# Patient Record
Sex: MALE | Race: ASIAN | NOT HISPANIC OR LATINO | Employment: FULL TIME | URBAN - METROPOLITAN AREA
[De-identification: names, ages, dates, MRNs, and addresses within clinical notes are randomized per-mention and may not be internally consistent; named-entity substitution may affect disease eponyms.]

---

## 2023-10-05 ENCOUNTER — TELEPHONE (OUTPATIENT)
Dept: PRIMARY CARE | Facility: HOSPITAL | Age: 28
End: 2023-10-05
Payer: COMMERCIAL

## 2023-10-05 DIAGNOSIS — Z79.4 TYPE 2 DIABETES MELLITUS WITHOUT COMPLICATION, WITH LONG-TERM CURRENT USE OF INSULIN (MULTI): Primary | ICD-10-CM

## 2023-10-05 DIAGNOSIS — E11.9 TYPE 2 DIABETES MELLITUS WITHOUT COMPLICATION, WITH LONG-TERM CURRENT USE OF INSULIN (MULTI): Primary | ICD-10-CM

## 2023-10-05 RX ORDER — DULAGLUTIDE 3 MG/.5ML
3 INJECTION, SOLUTION SUBCUTANEOUS
COMMUNITY
End: 2023-10-05 | Stop reason: SDUPTHER

## 2023-10-05 NOTE — TELEPHONE ENCOUNTER
Rx Refill Request Telephone Encounter    Name:  Tawanda Lomeli  :  [id number]  Medication Name:  Trulicity  3mg/0.5 ml  Subcutaneous Solution Pen-Injection  Every week  90 day supply  1 injection per week  Specific Pharmacy location:  [facility] #[id number]  Date of last appointment:  10/14/2022  Date of next appointment:  2023  Best number to reach patient:  [phone]

## 2023-10-09 RX ORDER — DULAGLUTIDE 3 MG/.5ML
3 INJECTION, SOLUTION SUBCUTANEOUS
Qty: 2 ML | Refills: 3 | Status: SHIPPED | OUTPATIENT
Start: 2023-10-09 | End: 2023-10-18

## 2023-11-08 ENCOUNTER — TELEPHONE (OUTPATIENT)
Dept: PRIMARY CARE | Facility: HOSPITAL | Age: 28
End: 2023-11-08
Payer: COMMERCIAL

## 2023-11-08 NOTE — TELEPHONE ENCOUNTER
Patient called requesting prior Auth for Trulicity per insurance company. Patient states Dr. Payne has prescribed Trulicity in the past and now insurance company will not cover the cost without documentation from Dr. Payne

## 2023-11-16 DIAGNOSIS — Z79.4 TYPE 2 DIABETES MELLITUS WITHOUT COMPLICATION, WITH LONG-TERM CURRENT USE OF INSULIN (MULTI): ICD-10-CM

## 2023-11-16 DIAGNOSIS — E11.9 TYPE 2 DIABETES MELLITUS WITHOUT COMPLICATION, WITH LONG-TERM CURRENT USE OF INSULIN (MULTI): ICD-10-CM

## 2023-11-16 RX ORDER — INSULIN HUMAN 500 [IU]/ML
180 INJECTION, SOLUTION SUBCUTANEOUS
COMMUNITY
End: 2023-11-16 | Stop reason: SDUPTHER

## 2023-11-17 ENCOUNTER — DOCUMENTATION (OUTPATIENT)
Dept: ENDOCRINOLOGY | Facility: HOSPITAL | Age: 28
End: 2023-11-17
Payer: COMMERCIAL

## 2023-11-17 RX ORDER — INSULIN HUMAN 500 [IU]/ML
INJECTION, SOLUTION SUBCUTANEOUS
Qty: 60 ML | Refills: 3 | Status: SHIPPED | OUTPATIENT
Start: 2023-11-17 | End: 2023-12-20 | Stop reason: SDUPTHER

## 2023-11-29 PROBLEM — E78.5 DYSLIPIDEMIA: Status: ACTIVE | Noted: 2023-11-29

## 2023-11-29 PROBLEM — L83 ACANTHOSIS NIGRICANS: Status: ACTIVE | Noted: 2023-11-29

## 2023-11-29 PROBLEM — E11.9 DIABETES MELLITUS TYPE 2 WITHOUT RETINOPATHY (MULTI): Status: ACTIVE | Noted: 2023-11-29

## 2023-11-29 PROBLEM — E66.9 OBESITY: Status: ACTIVE | Noted: 2023-11-29

## 2023-11-29 PROBLEM — L40.0 PSORIASIS VULGARIS: Status: ACTIVE | Noted: 2023-05-19

## 2023-11-29 PROBLEM — H26.9: Status: ACTIVE | Noted: 2023-11-29

## 2023-11-29 PROBLEM — G47.33 OBSTRUCTIVE SLEEP APNEA: Status: ACTIVE | Noted: 2023-11-29

## 2023-11-29 PROBLEM — I10 HTN (HYPERTENSION): Status: ACTIVE | Noted: 2023-11-29

## 2023-11-29 PROBLEM — K76.0 NAFLD (NONALCOHOLIC FATTY LIVER DISEASE): Status: ACTIVE | Noted: 2023-11-29

## 2023-11-29 RX ORDER — SYRINGE-NEEDLE,INSULIN,0.5 ML 30 G X1/2"
SYRINGE, EMPTY DISPOSABLE MISCELLANEOUS
COMMUNITY
Start: 2015-12-15

## 2023-11-29 RX ORDER — LISINOPRIL 20 MG/1
1 TABLET ORAL DAILY
COMMUNITY
Start: 2014-11-08 | End: 2023-12-13 | Stop reason: SDUPTHER

## 2023-11-29 RX ORDER — METFORMIN HYDROCHLORIDE 1000 MG/1
1000 TABLET ORAL
COMMUNITY
End: 2024-05-17 | Stop reason: SDUPTHER

## 2023-11-29 RX ORDER — HYDROCORTISONE 25 MG/G
OINTMENT TOPICAL
COMMUNITY
Start: 2023-05-19 | End: 2024-02-08 | Stop reason: WASHOUT

## 2023-11-29 RX ORDER — TRIAMCINOLONE ACETONIDE 1 MG/G
CREAM TOPICAL
COMMUNITY
Start: 2023-05-19 | End: 2024-01-24 | Stop reason: HOSPADM

## 2023-11-29 RX ORDER — METFORMIN HYDROCHLORIDE 500 MG/1
TABLET ORAL 2 TIMES DAILY
COMMUNITY
End: 2024-01-24 | Stop reason: HOSPADM

## 2023-12-13 ENCOUNTER — OFFICE VISIT (OUTPATIENT)
Dept: ENDOCRINOLOGY | Facility: HOSPITAL | Age: 28
End: 2023-12-13
Payer: COMMERCIAL

## 2023-12-13 VITALS
OXYGEN SATURATION: 96 % | SYSTOLIC BLOOD PRESSURE: 144 MMHG | HEART RATE: 116 BPM | DIASTOLIC BLOOD PRESSURE: 82 MMHG | HEIGHT: 66 IN | BODY MASS INDEX: 50.62 KG/M2 | WEIGHT: 315 LBS | TEMPERATURE: 97.6 F

## 2023-12-13 DIAGNOSIS — E78.5 DYSLIPIDEMIA: ICD-10-CM

## 2023-12-13 DIAGNOSIS — E66.01 CLASS 3 SEVERE OBESITY DUE TO EXCESS CALORIES WITH SERIOUS COMORBIDITY AND BODY MASS INDEX (BMI) OF 50.0 TO 59.9 IN ADULT (MULTI): ICD-10-CM

## 2023-12-13 DIAGNOSIS — E11.9 DIABETES MELLITUS TYPE 2 WITHOUT RETINOPATHY (MULTI): Primary | ICD-10-CM

## 2023-12-13 LAB
ALBUMIN SERPL BCP-MCNC: 4.2 G/DL (ref 3.4–5)
ANION GAP SERPL CALC-SCNC: 11 MMOL/L (ref 10–20)
BUN SERPL-MCNC: 12 MG/DL (ref 6–23)
CALCIUM SERPL-MCNC: 8.8 MG/DL (ref 8.6–10.6)
CHLORIDE SERPL-SCNC: 102 MMOL/L (ref 98–107)
CHOLEST SERPL-MCNC: 141 MG/DL (ref 0–199)
CHOLESTEROL/HDL RATIO: 5.4
CO2 SERPL-SCNC: 31 MMOL/L (ref 21–32)
CREAT SERPL-MCNC: 0.89 MG/DL (ref 0.5–1.3)
CREAT UR-MCNC: 110.7 MG/DL (ref 20–370)
EST. AVERAGE GLUCOSE BLD GHB EST-MCNC: 183 MG/DL
GFR SERPL CREATININE-BSD FRML MDRD: >90 ML/MIN/1.73M*2
GLUCOSE BLD MANUAL STRIP-MCNC: 81 MG/DL (ref 74–99)
GLUCOSE SERPL-MCNC: 70 MG/DL (ref 74–99)
HBA1C MFR BLD: 8 %
HDLC SERPL-MCNC: 25.9 MG/DL
LDLC SERPL CALC-MCNC: 94 MG/DL
MICROALBUMIN UR-MCNC: 569.9 MG/L
MICROALBUMIN/CREAT UR: 514.8 UG/MG CREAT
NON HDL CHOLESTEROL: 115 MG/DL (ref 0–149)
PHOSPHATE SERPL-MCNC: 4.2 MG/DL (ref 2.5–4.9)
POTASSIUM SERPL-SCNC: 4 MMOL/L (ref 3.5–5.3)
SODIUM SERPL-SCNC: 140 MMOL/L (ref 136–145)
TRIGL SERPL-MCNC: 104 MG/DL (ref 0–149)
TSH SERPL-ACNC: 3.86 MIU/L (ref 0.44–3.98)
VLDL: 21 MG/DL (ref 0–40)

## 2023-12-13 PROCEDURE — 80069 RENAL FUNCTION PANEL: CPT | Performed by: STUDENT IN AN ORGANIZED HEALTH CARE EDUCATION/TRAINING PROGRAM

## 2023-12-13 PROCEDURE — 3008F BODY MASS INDEX DOCD: CPT | Performed by: STUDENT IN AN ORGANIZED HEALTH CARE EDUCATION/TRAINING PROGRAM

## 2023-12-13 PROCEDURE — 36416 COLLJ CAPILLARY BLOOD SPEC: CPT | Performed by: STUDENT IN AN ORGANIZED HEALTH CARE EDUCATION/TRAINING PROGRAM

## 2023-12-13 PROCEDURE — 3062F POS MACROALBUMINURIA REV: CPT | Performed by: STUDENT IN AN ORGANIZED HEALTH CARE EDUCATION/TRAINING PROGRAM

## 2023-12-13 PROCEDURE — 99215 OFFICE O/P EST HI 40 MIN: CPT | Performed by: STUDENT IN AN ORGANIZED HEALTH CARE EDUCATION/TRAINING PROGRAM

## 2023-12-13 PROCEDURE — 1036F TOBACCO NON-USER: CPT | Performed by: STUDENT IN AN ORGANIZED HEALTH CARE EDUCATION/TRAINING PROGRAM

## 2023-12-13 PROCEDURE — 36415 COLL VENOUS BLD VENIPUNCTURE: CPT | Performed by: STUDENT IN AN ORGANIZED HEALTH CARE EDUCATION/TRAINING PROGRAM

## 2023-12-13 PROCEDURE — 3077F SYST BP >= 140 MM HG: CPT | Performed by: STUDENT IN AN ORGANIZED HEALTH CARE EDUCATION/TRAINING PROGRAM

## 2023-12-13 PROCEDURE — 4010F ACE/ARB THERAPY RXD/TAKEN: CPT | Performed by: STUDENT IN AN ORGANIZED HEALTH CARE EDUCATION/TRAINING PROGRAM

## 2023-12-13 PROCEDURE — 83036 HEMOGLOBIN GLYCOSYLATED A1C: CPT | Performed by: STUDENT IN AN ORGANIZED HEALTH CARE EDUCATION/TRAINING PROGRAM

## 2023-12-13 PROCEDURE — 80061 LIPID PANEL: CPT | Performed by: STUDENT IN AN ORGANIZED HEALTH CARE EDUCATION/TRAINING PROGRAM

## 2023-12-13 PROCEDURE — 82947 ASSAY GLUCOSE BLOOD QUANT: CPT | Performed by: STUDENT IN AN ORGANIZED HEALTH CARE EDUCATION/TRAINING PROGRAM

## 2023-12-13 PROCEDURE — 82043 UR ALBUMIN QUANTITATIVE: CPT | Performed by: STUDENT IN AN ORGANIZED HEALTH CARE EDUCATION/TRAINING PROGRAM

## 2023-12-13 PROCEDURE — 3079F DIAST BP 80-89 MM HG: CPT | Performed by: STUDENT IN AN ORGANIZED HEALTH CARE EDUCATION/TRAINING PROGRAM

## 2023-12-13 PROCEDURE — 84443 ASSAY THYROID STIM HORMONE: CPT | Performed by: STUDENT IN AN ORGANIZED HEALTH CARE EDUCATION/TRAINING PROGRAM

## 2023-12-13 PROCEDURE — 3052F HG A1C>EQUAL 8.0%<EQUAL 9.0%: CPT | Performed by: STUDENT IN AN ORGANIZED HEALTH CARE EDUCATION/TRAINING PROGRAM

## 2023-12-13 PROCEDURE — 3048F LDL-C <100 MG/DL: CPT | Performed by: STUDENT IN AN ORGANIZED HEALTH CARE EDUCATION/TRAINING PROGRAM

## 2023-12-13 RX ORDER — LISINOPRIL 20 MG/1
20 TABLET ORAL DAILY
Qty: 90 TABLET | Refills: 3 | Status: SHIPPED | OUTPATIENT
Start: 2023-12-13 | End: 2024-02-01 | Stop reason: SDUPTHER

## 2023-12-13 ASSESSMENT — ENCOUNTER SYMPTOMS
DEPRESSION: 0
OCCASIONAL FEELINGS OF UNSTEADINESS: 0
LOSS OF SENSATION IN FEET: 0

## 2023-12-13 ASSESSMENT — LIFESTYLE VARIABLES
HOW MANY STANDARD DRINKS CONTAINING ALCOHOL DO YOU HAVE ON A TYPICAL DAY: PATIENT DOES NOT DRINK
HOW OFTEN DO YOU HAVE A DRINK CONTAINING ALCOHOL: NEVER
HOW OFTEN DO YOU HAVE SIX OR MORE DRINKS ON ONE OCCASION: NEVER
AUDIT-C TOTAL SCORE: 0
SKIP TO QUESTIONS 9-10: 1

## 2023-12-13 ASSESSMENT — PATIENT HEALTH QUESTIONNAIRE - PHQ9
SUM OF ALL RESPONSES TO PHQ9 QUESTIONS 1 & 2: 0
1. LITTLE INTEREST OR PLEASURE IN DOING THINGS: NOT AT ALL
2. FEELING DOWN, DEPRESSED OR HOPELESS: NOT AT ALL

## 2023-12-13 ASSESSMENT — PAIN SCALES - GENERAL: PAINLEVEL: 0-NO PAIN

## 2023-12-13 NOTE — LETTER
December 13, 2023     Patient: Tawanda Lomeli   YOB: 1995   Date of Visit: 12/13/2023       To Whom It May Concern:    Tawanda Lomeli was seen in my clinic on 12/13/2023 at 8:00 am. Please excuse Tawanda for his absence from work on this day to make the appointment.    If you have any questions or concerns, please don't hesitate to call.         Sincerely,         Cecelia Payne MD        CC: No Recipients

## 2023-12-13 NOTE — PROGRESS NOTES
Patient coming in for follow up for T2DM    Subjective   Tawanda Lomeli is a 28 y.o. male who presents for follow up for Type 2 diabetes mellitus.   Lab Results   Component Value Date    HGBA1C 6.3 (A) 10/14/2022    Mr. Lomeli is a 28-year-old man with history of IDDM on U500, morbid obesity, NAFLD, psoriasis and hyperlipidemia coming in for follow-up.  date of diagnosis: age 11.Date of last HbA1c: October 2022 and results: 6.3%.   Last seen Jan 2023.  Lost his insurance for few months couldn't get his trulicity. BG were elevated  In the morning feels like he needs to use the bathroom for a longer time.  Feels like there's a lot of gas   Initially lost weight 80 lbs and now gained it back. 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  U500 36 units Before Breakfast, lunch and dinner.   Tulicity 3 mg restarted 3 weeks ago on Monday  Metformin 1000 mg BID   Lisinopril, Fenofibrate , Atorvastatin   BG in am: [phone]  BG bed time: [phone]  Had couple BG in low 100s and felt shakes.  In the past month doesn't have appetite.  Breakfast: Coffee  Lunch: Rice and chicken  Dinner: cup of rice and fish whatever he can find.   Scheduled to see dermatologist in January for psoriasis  Diabetes Surveillance: Eye exam: Jan 2023 Foot exam/podiatrist: Jan 13, 2023 Scheduled for 2024  Opthalmic: Diabetes mellitus with both eyes affected by mild nonproliferative retinopathy without macular edema  Cardiovascular: no coronary artery bypass graft and no coronary artery disease. Atorvastatin and fenofibrate.   Renal: nephropathy UACR 2022 155.5, but no end stage renal disease .On Lisinopril.   Neurologic: no neuropathy.     Ran out of Atorvastatin and fenofibrate for 3 days  Out of lisinopril    Review of Systems  all pertinent systems reviewed and are otherwise negative   Objective   /82 (BP Location:  "Right arm, Patient Position: Sitting, BP Cuff Size: Adult)   Pulse (!) 116   Temp 36.4 °C (97.6 °F) (Temporal)   Ht 1.676 m (5' 6\")   Wt (!) 160 kg (352 lb)   SpO2 96%   BMI 56.81 kg/m²   Physical Exam  Constitutional:       General: He is not in acute distress.     Appearance: Normal appearance. He is obese.   Eyes:      Extraocular Movements: Extraocular movements intact.      Pupils: Pupils are equal, round, and reactive to light.   Cardiovascular:      Rate and Rhythm: Normal rate and regular rhythm.   Pulmonary:      Effort: Pulmonary effort is normal. No respiratory distress.      Breath sounds: Normal breath sounds.   Abdominal:      General: Bowel sounds are normal.      Palpations: Abdomen is soft.      Tenderness: There is no abdominal tenderness.   Skin:     General: Skin is dry.      Coloration: Skin is not jaundiced or pale.      Findings: Lesion and rash present. No erythema.      Comments: Acanthosis nigricans and some plaques resembling psoriasis   Neurological:      General: No focal deficit present.      Mental Status: He is alert and oriented to person, place, and time.      Deep Tendon Reflexes: Reflexes normal.   Psychiatric:         Mood and Affect: Mood normal.         Behavior: Behavior normal.         Lab Review  Glucose (mg/dL)   Date Value   10/14/2022 104 (H)   06/24/2022 80   01/07/2022 154 (H)     Hemoglobin A1C (%)   Date Value   10/14/2022 6.3 (A)   06/24/2022 6.6 (A)   01/07/2022 8.5 (A)     Bicarbonate (mmol/L)   Date Value   10/14/2022 30   06/24/2022 27   01/07/2022 29     Urea Nitrogen (mg/dL)   Date Value   10/14/2022 12   06/24/2022 9   01/07/2022 11     Creatinine (mg/dL)   Date Value   10/14/2022 0.73   06/24/2022 0.81   01/07/2022 0.77     Lab Results   Component Value Date    CHOL 115 06/24/2022    CHOL 150 07/16/2021    CHOL 156 03/15/2021     Lab Results   Component Value Date    HDL 23.8 (A) 06/24/2022    HDL 20.4 (A) 07/16/2021    HDL 16.3 (A) 03/15/2021     No " "results found for: \"LDLCALC\"  Lab Results   Component Value Date    TRIG 146 06/24/2022    TRIG 328 (H) 07/16/2021    TRIG 552 (H) 03/15/2021     No components found for: \"CHOLHDL\"   Lab Results   Component Value Date    TSH 2.94 06/24/2022       Assessment/Plan    Mr. Lomeli is a 28-year-old man with history of IDDM on U500, morbid obesity, NAFLD, psoriasis and hyperlipidemia coming in for follow-up.  date of diagnosis: age 11.Date of last HbA1c: October 2022 and results: 6.3%.   Last seen Jan 2023.. Lost his insurance for few months couldn't get his trulicity. BG were elevated  In the morning feels like he needs to use the bathroom for a longer time. Feels like there's a lot of gas   Initially lost weight 80 lbs and now gained it back. 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  U500 36 units Before Breakfast, lunch and dinner.   Tulicity 3 mg restarted 3 weeks ago on Monday  Metformin 1000 mg BID   Lisinopril, Fenofibrate , Atorvastatin   BG in am: [phone]  BG bed time: [phone]  Had couple BG in low 100s and felt shakes.  In the past month doesn't have appetite.  Diabetes Surveillance: Eye exam: Jan 2023 Foot exam/podiatrist: Jan 13, 2023 Scheduled for 2024  Opthalmic: Diabetes mellitus with both eyes affected by mild nonproliferative retinopathy without macular edema  Cardiovascular: no coronary artery bypass graft and no coronary artery disease. Atorvastatin and fenofibrate.   Renal: nephropathy UACR 2022 155.5, but no end stage renal disease .On Lisinopril.   Neurologic: no neuropathy.     Plan:  Continue U500 36 units three times daily  Continue Trulicity 3 mg weekly  Continue to monitor BG  Follow up with dietician  Watch diet and start exercising  If in 1 month no weight loss we will try to switch to ozempic or mounjaro  Continue Atorvastatin and fenofibrate  Continue " lisinopril  Blood and urine today.  We might consider adding Jardiance     RTC in [facility]  Problem List Items Addressed This Visit       Diabetes mellitus type 2 without retinopathy (CMS/[facility]) - Primary    Relevant Medications    lisinopril 20 mg tablet    Other Relevant Orders    Renal Function Panel    Albumin , Urine Random    Lipid Panel    Hemoglobin A1C    TSH with reflex to Free T4 if abnormal    Referral to Nutrition Services    Dyslipidemia    Relevant Orders    Lipid Panel    Obesity    Relevant Orders    Lipid Panel    Hemoglobin A1C    TSH with reflex to Free T4 if abnormal    Referral to Nutrition Services

## 2023-12-13 NOTE — PATIENT INSTRUCTIONS
Continue U500 36 units three times daily  Continue Trulicity 3 mg weekly  Continue to monitor BG if low BG let me know\  Follow up with dietician  Watch diet and start exercising  If in 1 month no weight loss please let me know we will try to switch to ozempic or mounjaro  Continue Atorvastatin and fenofibrate  Continue lisinopril  Blood and urine today.  We might consider adding Matilde     RTC in [facility]

## 2023-12-13 NOTE — LETTER
December 13, 2023     Patient: Tawanda Lomeli   YOB: 1995   Date of Visit: 12/13/2023       To Whom It May Concern:    Mr. Tawanda Lomeli is on Trulicity which causes some gastrointestinal side effects. He needs 2 bathroom breaks and can take up to 15 min.    If you have any questions or concerns, please don't hesitate to call.         Sincerely,        Cecelia Payne MD

## 2023-12-20 DIAGNOSIS — E11.9 TYPE 2 DIABETES MELLITUS WITHOUT COMPLICATION, WITH LONG-TERM CURRENT USE OF INSULIN (MULTI): ICD-10-CM

## 2023-12-20 DIAGNOSIS — Z79.4 TYPE 2 DIABETES MELLITUS WITHOUT COMPLICATION, WITH LONG-TERM CURRENT USE OF INSULIN (MULTI): ICD-10-CM

## 2023-12-25 RX ORDER — INSULIN HUMAN 500 [IU]/ML
INJECTION, SOLUTION SUBCUTANEOUS
Qty: 50 ML | Refills: 3 | Status: SHIPPED | OUTPATIENT
Start: 2023-12-25 | End: 2024-05-16 | Stop reason: SDUPTHER

## 2024-01-11 ENCOUNTER — OFFICE VISIT (OUTPATIENT)
Dept: PODIATRY | Facility: CLINIC | Age: 29
End: 2024-01-11
Payer: COMMERCIAL

## 2024-01-11 ENCOUNTER — TELEPHONE (OUTPATIENT)
Dept: PRIMARY CARE | Facility: HOSPITAL | Age: 29
End: 2024-01-11

## 2024-01-11 DIAGNOSIS — B35.1 ONYCHOMYCOSIS: ICD-10-CM

## 2024-01-11 DIAGNOSIS — M79.671 PAIN IN BOTH FEET: ICD-10-CM

## 2024-01-11 DIAGNOSIS — E11.8 DIABETIC FOOT (MULTI): Primary | ICD-10-CM

## 2024-01-11 DIAGNOSIS — E11.9 DIABETES MELLITUS TYPE 2 WITHOUT RETINOPATHY (MULTI): Primary | ICD-10-CM

## 2024-01-11 DIAGNOSIS — M79.672 PAIN IN BOTH FEET: ICD-10-CM

## 2024-01-11 DIAGNOSIS — M54.16 LUMBAR RADICULOPATHY: ICD-10-CM

## 2024-01-11 DIAGNOSIS — G47.33 OBSTRUCTIVE SLEEP APNEA: ICD-10-CM

## 2024-01-11 DIAGNOSIS — L85.3 XEROSIS OF SKIN: ICD-10-CM

## 2024-01-11 PROCEDURE — 1036F TOBACCO NON-USER: CPT | Performed by: PODIATRIST

## 2024-01-11 PROCEDURE — 4010F ACE/ARB THERAPY RXD/TAKEN: CPT | Performed by: PODIATRIST

## 2024-01-11 PROCEDURE — 99213 OFFICE O/P EST LOW 20 MIN: CPT | Performed by: PODIATRIST

## 2024-01-11 PROCEDURE — 3008F BODY MASS INDEX DOCD: CPT | Performed by: PODIATRIST

## 2024-01-11 RX ORDER — PRAVASTATIN SODIUM 40 MG/1
40 TABLET ORAL
COMMUNITY
Start: 2016-04-26 | End: 2024-01-24 | Stop reason: HOSPADM

## 2024-01-11 RX ORDER — FENOFIBRATE 160 MG/1
160 TABLET ORAL DAILY
COMMUNITY
End: 2024-03-04 | Stop reason: SDUPTHER

## 2024-01-11 RX ORDER — ATORVASTATIN CALCIUM 40 MG/1
40 TABLET, FILM COATED ORAL NIGHTLY
COMMUNITY
End: 2024-03-04 | Stop reason: SDUPTHER

## 2024-01-11 NOTE — PROGRESS NOTES
Chief Complaint   Patient presents with    DM Foot Care     ALEJANDRO    Patient is here today for yearly diabetic foot exam.  Patient lost his insurance and was off some medication for awhile.  Patient is now starting back on his prescription medications after getting a job.  Patient works from home and sits for longer.'s of times at a desk than before.  Glucose was as high as 13, had it down to 6 before he lost insurance.  Patient notices some sharp radiating pains and feels like his feet are falling asleep specially when he is sitting with legs elevated.  Patient does have an upcoming appointment with dermatology for his psoriasis.  Is not currently using ammonium lactate lotion.        Physical Exam  Patient alert, oriented, no acute distress     VASC: +2/4 DP, unable to palpate PT secondary to edema B/L. CFT brisk all digits. Feet warm to touch. (+)hair growth B/L. Moderate LE edema B/L. No weeping noted.     NEURO: Vibratory intact B/L.  Light touch intact B/L.   5.07 [place] monofilament intact B/L.     DERM:Nails 1 bilateral are thickened, discolored, crumbly, painful and elongated with subungual debris. Mild distal fungal infection. Cryptotic hallux nails B/L, no pain. Dry dark, thick scaly patches on skin on legs , mild xerosis plantar feet. No ulcers, no fissures, no bulla, no weeping noted.     MUSCULOSKEL: +5/5 muscle strength B/L. pes planus B/L . Decreased ankle joint ROM B/L.    Lab Results   Component Value Date    HGBA1C 8.0 (H) 12/13/2023     Assessment and plan  #1 DM  discussed general diabetic foot care.  Discussed good control of glucose will help control any burning sensations that are secondary to the elevated glucose.  Follow-up yearly     #2 xerosis and acanthosis nigricans  Patient declines ammonium lactate at this time.    Patient elects to follow-up with dermatology and follow their recommendations    #3  Radiculopathy  Refer to orthopedics  Follow-up as needed

## 2024-01-11 NOTE — TELEPHONE ENCOUNTER
Patient called in stating that he is having side affects to Trulicity that is affecting him like bloated, no energy, and stomach pain, low blood sugary sometimes. Patient would like a call back to see what are his options in getting something different, please and thank you!    Patient reports Trulicity last week had sore abdomen as if full of gas, felt dizzy afterwards.This week was a little better but still had some hard abdomen?  We suggested decreasing trulicity to 1.5 mg for 4 weeks then increase back to 3 if tolerated.

## 2024-01-16 RX ORDER — DULAGLUTIDE 1.5 MG/.5ML
1.5 INJECTION, SOLUTION SUBCUTANEOUS
Qty: 2 ML | Refills: 1 | Status: SHIPPED | OUTPATIENT
Start: 2024-01-16 | End: 2024-01-26 | Stop reason: SDUPTHER

## 2024-01-19 ENCOUNTER — APPOINTMENT (OUTPATIENT)
Dept: RADIOLOGY | Facility: HOSPITAL | Age: 29
DRG: 193 | End: 2024-01-19
Payer: COMMERCIAL

## 2024-01-19 ENCOUNTER — HOSPITAL ENCOUNTER (INPATIENT)
Facility: HOSPITAL | Age: 29
LOS: 5 days | Discharge: HOME | DRG: 193 | End: 2024-01-24
Attending: INTERNAL MEDICINE | Admitting: STUDENT IN AN ORGANIZED HEALTH CARE EDUCATION/TRAINING PROGRAM
Payer: COMMERCIAL

## 2024-01-19 ENCOUNTER — APPOINTMENT (OUTPATIENT)
Dept: CARDIOLOGY | Facility: HOSPITAL | Age: 29
DRG: 193 | End: 2024-01-19
Payer: COMMERCIAL

## 2024-01-19 DIAGNOSIS — R09.02 HYPOXIA: ICD-10-CM

## 2024-01-19 DIAGNOSIS — R06.02 SHORTNESS OF BREATH: Primary | ICD-10-CM

## 2024-01-19 DIAGNOSIS — G47.33 OBSTRUCTIVE SLEEP APNEA: ICD-10-CM

## 2024-01-19 DIAGNOSIS — R06.89 HYPERCAPNIA: ICD-10-CM

## 2024-01-19 DIAGNOSIS — I10 HYPERTENSION, UNSPECIFIED TYPE: ICD-10-CM

## 2024-01-19 DIAGNOSIS — R06.09 OTHER FORMS OF DYSPNEA: ICD-10-CM

## 2024-01-19 DIAGNOSIS — E66.01 CLASS 3 SEVERE OBESITY WITH SERIOUS COMORBIDITY AND BODY MASS INDEX (BMI) OF 50.0 TO 59.9 IN ADULT, UNSPECIFIED OBESITY TYPE (MULTI): ICD-10-CM

## 2024-01-19 LAB
ALBUMIN SERPL BCP-MCNC: 4 G/DL (ref 3.4–5)
ALP SERPL-CCNC: 61 U/L (ref 33–120)
ALT SERPL W P-5'-P-CCNC: 11 U/L (ref 10–52)
ANION GAP SERPL CALC-SCNC: 12 MMOL/L (ref 10–20)
APTT PPP: 37 SECONDS (ref 27–38)
AST SERPL W P-5'-P-CCNC: 20 U/L (ref 9–39)
B-OH-BUTYR SERPL-SCNC: 0.16 MMOL/L (ref 0.02–0.27)
BASE EXCESS BLDV CALC-SCNC: 5.7 MMOL/L (ref -2–3)
BASOPHILS # BLD AUTO: 0.07 X10*3/UL (ref 0–0.1)
BASOPHILS NFR BLD AUTO: 0.7 %
BILIRUB SERPL-MCNC: 1.1 MG/DL (ref 0–1.2)
BNP SERPL-MCNC: 251 PG/ML (ref 0–99)
BODY TEMPERATURE: 37 DEGREES CELSIUS
BUN SERPL-MCNC: 15 MG/DL (ref 6–23)
CALCIUM SERPL-MCNC: 8.4 MG/DL (ref 8.6–10.3)
CARDIAC TROPONIN I PNL SERPL HS: 23 NG/L (ref 0–20)
CARDIAC TROPONIN I PNL SERPL HS: 27 NG/L (ref 0–20)
CHLORIDE SERPL-SCNC: 96 MMOL/L (ref 98–107)
CO2 SERPL-SCNC: 30 MMOL/L (ref 21–32)
CREAT SERPL-MCNC: 1.18 MG/DL (ref 0.5–1.3)
CRITICAL CALL TIME: 2132
CRITICAL CALLED BY: ABNORMAL
CRITICAL CALLED TO: ABNORMAL
CRITICAL READ BACK: ABNORMAL
D DIMER PPP FEU-MCNC: 540 NG/ML FEU
EGFRCR SERPLBLD CKD-EPI 2021: 86 ML/MIN/1.73M*2
EOSINOPHIL # BLD AUTO: 0.13 X10*3/UL (ref 0–0.7)
EOSINOPHIL NFR BLD AUTO: 1.3 %
ERYTHROCYTE [DISTWIDTH] IN BLOOD BY AUTOMATED COUNT: 17.4 % (ref 11.5–14.5)
FLUAV RNA RESP QL NAA+PROBE: NOT DETECTED
FLUBV RNA RESP QL NAA+PROBE: NOT DETECTED
GLUCOSE SERPL-MCNC: 150 MG/DL (ref 74–99)
HCO3 BLDV-SCNC: 35.4 MMOL/L (ref 22–26)
HCT VFR BLD AUTO: 51.5 % (ref 41–52)
HGB BLD-MCNC: 15 G/DL (ref 13.5–17.5)
IMM GRANULOCYTES # BLD AUTO: 0.05 X10*3/UL (ref 0–0.7)
IMM GRANULOCYTES NFR BLD AUTO: 0.5 % (ref 0–0.9)
INHALED O2 CONCENTRATION: 21 %
INR PPP: 1.7 (ref 0.9–1.1)
LYMPHOCYTES # BLD AUTO: 0.88 X10*3/UL (ref 1.2–4.8)
LYMPHOCYTES NFR BLD AUTO: 8.5 %
MAGNESIUM SERPL-MCNC: 1.55 MG/DL (ref 1.6–2.4)
MCH RBC QN AUTO: 22.5 PG (ref 26–34)
MCHC RBC AUTO-ENTMCNC: 29.1 G/DL (ref 32–36)
MCV RBC AUTO: 77 FL (ref 80–100)
MONOCYTES # BLD AUTO: 0.8 X10*3/UL (ref 0.1–1)
MONOCYTES NFR BLD AUTO: 7.7 %
NEUTROPHILS # BLD AUTO: 8.47 X10*3/UL (ref 1.2–7.7)
NEUTROPHILS NFR BLD AUTO: 81.3 %
NRBC BLD-RTO: 0.2 /100 WBCS (ref 0–0)
OXYHGB MFR BLDV: 35.1 % (ref 45–75)
PCO2 BLDV: 77 MM HG (ref 41–51)
PH BLDV: 7.27 PH (ref 7.33–7.43)
PLATELET # BLD AUTO: 257 X10*3/UL (ref 150–450)
PO2 BLDV: 29 MM HG (ref 35–45)
POTASSIUM SERPL-SCNC: 4.3 MMOL/L (ref 3.5–5.3)
PROT SERPL-MCNC: 7.3 G/DL (ref 6.4–8.2)
PROTHROMBIN TIME: 19.5 SECONDS (ref 9.8–12.8)
RBC # BLD AUTO: 6.66 X10*6/UL (ref 4.5–5.9)
SAO2 % BLDV: 36 % (ref 45–75)
SARS-COV-2 RNA RESP QL NAA+PROBE: NOT DETECTED
SODIUM SERPL-SCNC: 134 MMOL/L (ref 136–145)
WBC # BLD AUTO: 10.4 X10*3/UL (ref 4.4–11.3)

## 2024-01-19 PROCEDURE — 87636 SARSCOV2 & INF A&B AMP PRB: CPT | Performed by: PHYSICIAN ASSISTANT

## 2024-01-19 PROCEDURE — 99285 EMERGENCY DEPT VISIT HI MDM: CPT | Performed by: INTERNAL MEDICINE

## 2024-01-19 PROCEDURE — 83880 ASSAY OF NATRIURETIC PEPTIDE: CPT | Performed by: PHYSICIAN ASSISTANT

## 2024-01-19 PROCEDURE — 85610 PROTHROMBIN TIME: CPT | Performed by: INTERNAL MEDICINE

## 2024-01-19 PROCEDURE — 80061 LIPID PANEL: CPT

## 2024-01-19 PROCEDURE — 94660 CPAP INITIATION&MGMT: CPT

## 2024-01-19 PROCEDURE — 36415 COLL VENOUS BLD VENIPUNCTURE: CPT | Performed by: PHYSICIAN ASSISTANT

## 2024-01-19 PROCEDURE — 2060000001 HC INTERMEDIATE ICU ROOM DAILY

## 2024-01-19 PROCEDURE — 99291 CRITICAL CARE FIRST HOUR: CPT | Mod: 27

## 2024-01-19 PROCEDURE — 71045 X-RAY EXAM CHEST 1 VIEW: CPT | Mod: FOREIGN READ | Performed by: RADIOLOGY

## 2024-01-19 PROCEDURE — 85025 COMPLETE CBC W/AUTO DIFF WBC: CPT | Performed by: PHYSICIAN ASSISTANT

## 2024-01-19 PROCEDURE — 80053 COMPREHEN METABOLIC PANEL: CPT | Performed by: PHYSICIAN ASSISTANT

## 2024-01-19 PROCEDURE — 5A09357 ASSISTANCE WITH RESPIRATORY VENTILATION, LESS THAN 24 CONSECUTIVE HOURS, CONTINUOUS POSITIVE AIRWAY PRESSURE: ICD-10-PCS | Performed by: STUDENT IN AN ORGANIZED HEALTH CARE EDUCATION/TRAINING PROGRAM

## 2024-01-19 PROCEDURE — 85379 FIBRIN DEGRADATION QUANT: CPT | Performed by: INTERNAL MEDICINE

## 2024-01-19 PROCEDURE — 82805 BLOOD GASES W/O2 SATURATION: CPT | Performed by: PHYSICIAN ASSISTANT

## 2024-01-19 PROCEDURE — 71045 X-RAY EXAM CHEST 1 VIEW: CPT

## 2024-01-19 PROCEDURE — 2500000001 HC RX 250 WO HCPCS SELF ADMINISTERED DRUGS (ALT 637 FOR MEDICARE OP): Performed by: PHYSICIAN ASSISTANT

## 2024-01-19 PROCEDURE — 83735 ASSAY OF MAGNESIUM: CPT | Performed by: PHYSICIAN ASSISTANT

## 2024-01-19 PROCEDURE — 82010 KETONE BODYS QUAN: CPT | Performed by: PHYSICIAN ASSISTANT

## 2024-01-19 PROCEDURE — 84484 ASSAY OF TROPONIN QUANT: CPT | Performed by: PHYSICIAN ASSISTANT

## 2024-01-19 PROCEDURE — 96374 THER/PROPH/DIAG INJ IV PUSH: CPT

## 2024-01-19 PROCEDURE — 2500000004 HC RX 250 GENERAL PHARMACY W/ HCPCS (ALT 636 FOR OP/ED)

## 2024-01-19 PROCEDURE — 2500000004 HC RX 250 GENERAL PHARMACY W/ HCPCS (ALT 636 FOR OP/ED): Performed by: PHYSICIAN ASSISTANT

## 2024-01-19 PROCEDURE — 85730 THROMBOPLASTIN TIME PARTIAL: CPT | Performed by: INTERNAL MEDICINE

## 2024-01-19 PROCEDURE — 93005 ELECTROCARDIOGRAM TRACING: CPT

## 2024-01-19 RX ORDER — INSULIN LISPRO 100 [IU]/ML
0-10 INJECTION, SOLUTION INTRAVENOUS; SUBCUTANEOUS EVERY 4 HOURS
Status: DISCONTINUED | OUTPATIENT
Start: 2024-01-20 | End: 2024-01-21

## 2024-01-19 RX ORDER — CEFTRIAXONE 1 G/50ML
1 INJECTION, SOLUTION INTRAVENOUS EVERY 24 HOURS
Status: DISCONTINUED | OUTPATIENT
Start: 2024-01-19 | End: 2024-01-24 | Stop reason: HOSPADM

## 2024-01-19 RX ORDER — ACETYLCYSTEINE 200 MG/ML
3 SOLUTION ORAL; RESPIRATORY (INHALATION)
Status: DISCONTINUED | OUTPATIENT
Start: 2024-01-19 | End: 2024-01-20

## 2024-01-19 RX ORDER — DEXTROSE 50 % IN WATER (D50W) INTRAVENOUS SYRINGE
25
Status: DISCONTINUED | OUTPATIENT
Start: 2024-01-19 | End: 2024-01-24 | Stop reason: HOSPADM

## 2024-01-19 RX ORDER — IPRATROPIUM BROMIDE AND ALBUTEROL SULFATE 2.5; .5 MG/3ML; MG/3ML
3 SOLUTION RESPIRATORY (INHALATION)
Status: DISCONTINUED | OUTPATIENT
Start: 2024-01-20 | End: 2024-01-19

## 2024-01-19 RX ORDER — IPRATROPIUM BROMIDE AND ALBUTEROL SULFATE 2.5; .5 MG/3ML; MG/3ML
3 SOLUTION RESPIRATORY (INHALATION) EVERY 2 HOUR PRN
Status: DISCONTINUED | OUTPATIENT
Start: 2024-01-19 | End: 2024-01-24 | Stop reason: HOSPADM

## 2024-01-19 RX ORDER — DEXTROSE MONOHYDRATE 100 MG/ML
0.3 INJECTION, SOLUTION INTRAVENOUS ONCE AS NEEDED
Status: DISCONTINUED | OUTPATIENT
Start: 2024-01-19 | End: 2024-01-24 | Stop reason: HOSPADM

## 2024-01-19 RX ORDER — FENOFIBRATE 160 MG/1
160 TABLET ORAL DAILY
Status: DISCONTINUED | OUTPATIENT
Start: 2024-01-20 | End: 2024-01-24 | Stop reason: HOSPADM

## 2024-01-19 RX ORDER — FUROSEMIDE 10 MG/ML
40 INJECTION INTRAMUSCULAR; INTRAVENOUS ONCE
Status: COMPLETED | OUTPATIENT
Start: 2024-01-19 | End: 2024-01-19

## 2024-01-19 RX ORDER — PRAVASTATIN SODIUM 20 MG/1
40 TABLET ORAL
Status: DISCONTINUED | OUTPATIENT
Start: 2024-01-20 | End: 2024-01-20

## 2024-01-19 RX ORDER — BENZONATATE 100 MG/1
100 CAPSULE ORAL 3 TIMES DAILY PRN
Status: DISCONTINUED | OUTPATIENT
Start: 2024-01-19 | End: 2024-01-24 | Stop reason: HOSPADM

## 2024-01-19 RX ORDER — ATORVASTATIN CALCIUM 40 MG/1
40 TABLET, FILM COATED ORAL NIGHTLY
Status: DISCONTINUED | OUTPATIENT
Start: 2024-01-20 | End: 2024-01-24 | Stop reason: HOSPADM

## 2024-01-19 RX ADMIN — AZITHROMYCIN MONOHYDRATE 500 MG: 500 INJECTION, POWDER, LYOPHILIZED, FOR SOLUTION INTRAVENOUS at 23:45

## 2024-01-19 RX ADMIN — FUROSEMIDE 40 MG: 10 INJECTION, SOLUTION INTRAMUSCULAR; INTRAVENOUS at 22:16

## 2024-01-19 RX ADMIN — NITROGLYCERIN 0.5 INCH: 20 OINTMENT TOPICAL at 22:16

## 2024-01-19 ASSESSMENT — LIFESTYLE VARIABLES
HAVE YOU EVER FELT YOU SHOULD CUT DOWN ON YOUR DRINKING: NO
EVER FELT BAD OR GUILTY ABOUT YOUR DRINKING: NO
EVER HAD A DRINK FIRST THING IN THE MORNING TO STEADY YOUR NERVES TO GET RID OF A HANGOVER: NO
REASON UNABLE TO ASSESS: NO
HAVE PEOPLE ANNOYED YOU BY CRITICIZING YOUR DRINKING: NO

## 2024-01-19 ASSESSMENT — COLUMBIA-SUICIDE SEVERITY RATING SCALE - C-SSRS
6. HAVE YOU EVER DONE ANYTHING, STARTED TO DO ANYTHING, OR PREPARED TO DO ANYTHING TO END YOUR LIFE?: NO
2. HAVE YOU ACTUALLY HAD ANY THOUGHTS OF KILLING YOURSELF?: NO
1. IN THE PAST MONTH, HAVE YOU WISHED YOU WERE DEAD OR WISHED YOU COULD GO TO SLEEP AND NOT WAKE UP?: NO

## 2024-01-19 ASSESSMENT — PAIN - FUNCTIONAL ASSESSMENT: PAIN_FUNCTIONAL_ASSESSMENT: 0-10

## 2024-01-19 ASSESSMENT — PAIN SCALES - GENERAL: PAINLEVEL_OUTOF10: 0 - NO PAIN

## 2024-01-20 ENCOUNTER — APPOINTMENT (OUTPATIENT)
Dept: RADIOLOGY | Facility: HOSPITAL | Age: 29
DRG: 193 | End: 2024-01-20
Payer: COMMERCIAL

## 2024-01-20 LAB
ANION GAP BLDA CALCULATED.4IONS-SCNC: 3 MMO/L (ref 10–25)
ANION GAP BLDA CALCULATED.4IONS-SCNC: 5 MMO/L (ref 10–25)
ANION GAP BLDA CALCULATED.4IONS-SCNC: 6 MMO/L (ref 10–25)
ANION GAP SERPL CALC-SCNC: 8 MMOL/L (ref 10–20)
APPARATUS: ABNORMAL
ARTERIAL PATENCY WRIST A: POSITIVE
BASE EXCESS BLDA CALC-SCNC: 4.8 MMOL/L (ref -2–3)
BASE EXCESS BLDA CALC-SCNC: 5.7 MMOL/L (ref -2–3)
BASE EXCESS BLDA CALC-SCNC: 6 MMOL/L (ref -2–3)
BASE EXCESS BLDA CALC-SCNC: 6.5 MMOL/L (ref -2–3)
BASE EXCESS BLDA CALC-SCNC: 7.1 MMOL/L (ref -2–3)
BASOPHILS # BLD AUTO: 0.04 X10*3/UL (ref 0–0.1)
BASOPHILS NFR BLD AUTO: 0.3 %
BODY TEMPERATURE: 37 DEGREES CELSIUS
BUN SERPL-MCNC: 15 MG/DL (ref 6–23)
CA-I BLDA-SCNC: 1.12 MMOL/L (ref 1.1–1.33)
CA-I BLDA-SCNC: 1.13 MMOL/L (ref 1.1–1.33)
CA-I BLDA-SCNC: 1.15 MMOL/L (ref 1.1–1.33)
CALCIUM SERPL-MCNC: 8.7 MG/DL (ref 8.6–10.3)
CHLORIDE BLDA-SCNC: 95 MMOL/L (ref 98–107)
CHLORIDE BLDA-SCNC: 95 MMOL/L (ref 98–107)
CHLORIDE BLDA-SCNC: 97 MMOL/L (ref 98–107)
CHLORIDE SERPL-SCNC: 95 MMOL/L (ref 98–107)
CHOLEST SERPL-MCNC: 62 MG/DL (ref 0–199)
CHOLESTEROL/HDL RATIO: 3.7
CO2 SERPL-SCNC: 36 MMOL/L (ref 21–32)
CREAT SERPL-MCNC: 1.16 MG/DL (ref 0.5–1.3)
CRITICAL CALL TIME: 1105
CRITICAL CALL TIME: 1519
CRITICAL CALL TIME: 1700
CRITICAL CALL TIME: 2032
CRITICAL CALL TIME: 2213
CRITICAL CALLED BY: ABNORMAL
CRITICAL CALLED TO: ABNORMAL
CRITICAL READ BACK: ABNORMAL
EGFRCR SERPLBLD CKD-EPI 2021: 88 ML/MIN/1.73M*2
EOSINOPHIL # BLD AUTO: 0.11 X10*3/UL (ref 0–0.7)
EOSINOPHIL NFR BLD AUTO: 0.9 %
EPAP CMH2O: 6 CM H2O
EPAP CMH2O: 6 CM H2O
ERYTHROCYTE [DISTWIDTH] IN BLOOD BY AUTOMATED COUNT: 17.2 % (ref 11.5–14.5)
FLOW: 10 LPM
FREQUENCY (BPM): 29 BPM
FREQUENCY (BPM): 37 BPM
GLUCOSE BLD MANUAL STRIP-MCNC: 121 MG/DL (ref 74–99)
GLUCOSE BLD MANUAL STRIP-MCNC: 122 MG/DL (ref 74–99)
GLUCOSE BLD MANUAL STRIP-MCNC: 129 MG/DL (ref 74–99)
GLUCOSE BLD MANUAL STRIP-MCNC: 131 MG/DL (ref 74–99)
GLUCOSE BLD MANUAL STRIP-MCNC: 131 MG/DL (ref 74–99)
GLUCOSE BLD MANUAL STRIP-MCNC: 142 MG/DL (ref 74–99)
GLUCOSE BLDA-MCNC: 137 MG/DL (ref 74–99)
GLUCOSE BLDA-MCNC: 141 MG/DL (ref 74–99)
GLUCOSE BLDA-MCNC: 153 MG/DL (ref 74–99)
GLUCOSE SERPL-MCNC: 135 MG/DL (ref 74–99)
HCO3 BLDA-SCNC: 36 MMOL/L (ref 22–26)
HCO3 BLDA-SCNC: 36.4 MMOL/L (ref 22–26)
HCO3 BLDA-SCNC: 37.3 MMOL/L (ref 22–26)
HCO3 BLDA-SCNC: 37.7 MMOL/L (ref 22–26)
HCO3 BLDA-SCNC: 38 MMOL/L (ref 22–26)
HCT VFR BLD AUTO: 50.6 % (ref 41–52)
HCT VFR BLD EST: 44 % (ref 41–52)
HDLC SERPL-MCNC: 16.6 MG/DL
HGB BLD-MCNC: 15.1 G/DL (ref 13.5–17.5)
HGB BLDA-MCNC: 14.6 G/DL (ref 13.5–17.5)
HGB BLDA-MCNC: 14.7 G/DL (ref 13.5–17.5)
HGB BLDA-MCNC: 14.8 G/DL (ref 13.5–17.5)
IMM GRANULOCYTES # BLD AUTO: 0.05 X10*3/UL (ref 0–0.7)
IMM GRANULOCYTES NFR BLD AUTO: 0.4 % (ref 0–0.9)
INHALED O2 CONCENTRATION: 55 %
INHALED O2 CONCENTRATION: 55 %
INHALED O2 CONCENTRATION: 56 %
INHALED O2 CONCENTRATION: 70 %
INHALED O2 CONCENTRATION: 70 %
IPAP CMH2O: 14 CM H2O
IPAP CMH2O: 18 CM H2O
LACTATE BLDA-SCNC: 0.8 MMOL/L (ref 0.4–2)
LACTATE BLDA-SCNC: 0.8 MMOL/L (ref 0.4–2)
LACTATE BLDA-SCNC: 0.9 MMOL/L (ref 0.4–2)
LACTATE SERPL-SCNC: 0.7 MMOL/L (ref 0.4–2)
LDLC SERPL CALC-MCNC: 32 MG/DL
LYMPHOCYTES # BLD AUTO: 1.05 X10*3/UL (ref 1.2–4.8)
LYMPHOCYTES NFR BLD AUTO: 8.6 %
MAGNESIUM SERPL-MCNC: 1.67 MG/DL (ref 1.6–2.4)
MCH RBC QN AUTO: 23 PG (ref 26–34)
MCHC RBC AUTO-ENTMCNC: 29.8 G/DL (ref 32–36)
MCV RBC AUTO: 77 FL (ref 80–100)
MONOCYTES # BLD AUTO: 1.11 X10*3/UL (ref 0.1–1)
MONOCYTES NFR BLD AUTO: 9.1 %
NEUTROPHILS # BLD AUTO: 9.9 X10*3/UL (ref 1.2–7.7)
NEUTROPHILS NFR BLD AUTO: 80.7 %
NON HDL CHOLESTEROL: 45 MG/DL (ref 0–149)
NRBC BLD-RTO: 0.2 /100 WBCS (ref 0–0)
OXYHGB MFR BLDA: 93.5 % (ref 94–98)
OXYHGB MFR BLDA: 93.6 % (ref 94–98)
OXYHGB MFR BLDA: 94.5 % (ref 94–98)
OXYHGB MFR BLDA: 94.7 % (ref 94–98)
OXYHGB MFR BLDA: 95.7 % (ref 94–98)
PCO2 BLDA: 84 MM HG (ref 38–42)
PCO2 BLDA: 87 MM HG (ref 38–42)
PCO2 BLDA: 88 MM HG (ref 38–42)
PCO2 BLDA: 89 MM HG (ref 38–42)
PCO2 BLDA: 95 MM HG (ref 38–42)
PH BLDA: 7.21 PH (ref 7.38–7.42)
PH BLDA: 7.22 PH (ref 7.38–7.42)
PH BLDA: 7.23 PH (ref 7.38–7.42)
PH BLDA: 7.23 PH (ref 7.38–7.42)
PH BLDA: 7.26 PH (ref 7.38–7.42)
PLATELET # BLD AUTO: 256 X10*3/UL (ref 150–450)
PO2 BLDA: 111 MM HG (ref 85–95)
PO2 BLDA: 79 MM HG (ref 85–95)
PO2 BLDA: 80 MM HG (ref 85–95)
PO2 BLDA: 81 MM HG (ref 85–95)
PO2 BLDA: 94 MM HG (ref 85–95)
POTASSIUM BLDA-SCNC: 4.9 MMOL/L (ref 3.5–5.3)
POTASSIUM BLDA-SCNC: 5 MMOL/L (ref 3.5–5.3)
POTASSIUM BLDA-SCNC: 5.1 MMOL/L (ref 3.5–5.3)
POTASSIUM SERPL-SCNC: 4.6 MMOL/L (ref 3.5–5.3)
PROCALCITONIN SERPL-MCNC: 0.27 NG/ML
RBC # BLD AUTO: 6.56 X10*6/UL (ref 4.5–5.9)
RSV RNA RESP QL NAA+PROBE: DETECTED
SAO2 % BLDA: 96 % (ref 94–100)
SAO2 % BLDA: 97 % (ref 94–100)
SAO2 % BLDA: 97 % (ref 94–100)
SAO2 % BLDA: 98 % (ref 94–100)
SAO2 % BLDA: 99 % (ref 94–100)
SODIUM BLDA-SCNC: 132 MMOL/L (ref 136–145)
SODIUM BLDA-SCNC: 132 MMOL/L (ref 136–145)
SODIUM BLDA-SCNC: 133 MMOL/L (ref 136–145)
SODIUM SERPL-SCNC: 134 MMOL/L (ref 136–145)
SPECIMEN DRAWN FROM PATIENT: ABNORMAL
SPONTANEOUS TIDAL VOLUME: 326 ML
SPONTANEOUS TIDAL VOLUME: 644 ML
TOTAL MINUTE VOLUME: 13.2 LITER
TOTAL MINUTE VOLUME: 8.9 LITER
TRIGL SERPL-MCNC: 68 MG/DL (ref 0–149)
VENTILATOR MODE: ABNORMAL
VENTILATOR MODE: ABNORMAL
VENTILATOR RATE: 12 BPM
VENTILATOR RATE: 12 BPM
VLDL: 14 MG/DL (ref 0–40)
WBC # BLD AUTO: 12.3 X10*3/UL (ref 4.4–11.3)

## 2024-01-20 PROCEDURE — 36600 WITHDRAWAL OF ARTERIAL BLOOD: CPT

## 2024-01-20 PROCEDURE — 99291 CRITICAL CARE FIRST HOUR: CPT | Performed by: INTERNAL MEDICINE

## 2024-01-20 PROCEDURE — 84145 PROCALCITONIN (PCT): CPT | Mod: PARLAB

## 2024-01-20 PROCEDURE — 82947 ASSAY GLUCOSE BLOOD QUANT: CPT

## 2024-01-20 PROCEDURE — 86738 MYCOPLASMA ANTIBODY: CPT

## 2024-01-20 PROCEDURE — 2500000004 HC RX 250 GENERAL PHARMACY W/ HCPCS (ALT 636 FOR OP/ED): Performed by: STUDENT IN AN ORGANIZED HEALTH CARE EDUCATION/TRAINING PROGRAM

## 2024-01-20 PROCEDURE — 87040 BLOOD CULTURE FOR BACTERIA: CPT | Mod: PARLAB | Performed by: STUDENT IN AN ORGANIZED HEALTH CARE EDUCATION/TRAINING PROGRAM

## 2024-01-20 PROCEDURE — 85025 COMPLETE CBC W/AUTO DIFF WBC: CPT

## 2024-01-20 PROCEDURE — 84132 ASSAY OF SERUM POTASSIUM: CPT

## 2024-01-20 PROCEDURE — 99223 1ST HOSP IP/OBS HIGH 75: CPT

## 2024-01-20 PROCEDURE — 2500000002 HC RX 250 W HCPCS SELF ADMINISTERED DRUGS (ALT 637 FOR MEDICARE OP, ALT 636 FOR OP/ED): Performed by: STUDENT IN AN ORGANIZED HEALTH CARE EDUCATION/TRAINING PROGRAM

## 2024-01-20 PROCEDURE — 87081 CULTURE SCREEN ONLY: CPT | Mod: PARLAB

## 2024-01-20 PROCEDURE — 2020000001 HC ICU ROOM DAILY

## 2024-01-20 PROCEDURE — 83735 ASSAY OF MAGNESIUM: CPT

## 2024-01-20 PROCEDURE — 36415 COLL VENOUS BLD VENIPUNCTURE: CPT

## 2024-01-20 PROCEDURE — 2550000001 HC RX 255 CONTRASTS: Performed by: STUDENT IN AN ORGANIZED HEALTH CARE EDUCATION/TRAINING PROGRAM

## 2024-01-20 PROCEDURE — 80048 BASIC METABOLIC PNL TOTAL CA: CPT

## 2024-01-20 PROCEDURE — 83605 ASSAY OF LACTIC ACID: CPT

## 2024-01-20 PROCEDURE — 94660 CPAP INITIATION&MGMT: CPT

## 2024-01-20 PROCEDURE — 87634 RSV DNA/RNA AMP PROBE: CPT

## 2024-01-20 PROCEDURE — 71275 CT ANGIOGRAPHY CHEST: CPT

## 2024-01-20 PROCEDURE — 93971 EXTREMITY STUDY: CPT | Performed by: RADIOLOGY

## 2024-01-20 PROCEDURE — 71275 CT ANGIOGRAPHY CHEST: CPT | Performed by: RADIOLOGY

## 2024-01-20 PROCEDURE — 93970 EXTREMITY STUDY: CPT

## 2024-01-20 PROCEDURE — 82805 BLOOD GASES W/O2 SATURATION: CPT | Performed by: STUDENT IN AN ORGANIZED HEALTH CARE EDUCATION/TRAINING PROGRAM

## 2024-01-20 PROCEDURE — 94640 AIRWAY INHALATION TREATMENT: CPT

## 2024-01-20 PROCEDURE — 2500000004 HC RX 250 GENERAL PHARMACY W/ HCPCS (ALT 636 FOR OP/ED)

## 2024-01-20 RX ORDER — INSULIN GLARGINE 100 [IU]/ML
10 INJECTION, SOLUTION SUBCUTANEOUS NIGHTLY
Status: DISCONTINUED | OUTPATIENT
Start: 2024-01-20 | End: 2024-01-24 | Stop reason: HOSPADM

## 2024-01-20 RX ORDER — TALC
6 POWDER (GRAM) TOPICAL NIGHTLY PRN
Status: DISCONTINUED | OUTPATIENT
Start: 2024-01-20 | End: 2024-01-24 | Stop reason: HOSPADM

## 2024-01-20 RX ORDER — ACETYLCYSTEINE 200 MG/ML
3 SOLUTION ORAL; RESPIRATORY (INHALATION) 4 TIMES DAILY PRN
Status: DISCONTINUED | OUTPATIENT
Start: 2024-01-20 | End: 2024-01-24 | Stop reason: HOSPADM

## 2024-01-20 RX ORDER — INSULIN GLARGINE 100 [IU]/ML
36 INJECTION, SOLUTION SUBCUTANEOUS NIGHTLY
Status: DISCONTINUED | OUTPATIENT
Start: 2024-01-20 | End: 2024-01-20

## 2024-01-20 RX ORDER — HEPARIN SODIUM 5000 [USP'U]/ML
7500 INJECTION, SOLUTION INTRAVENOUS; SUBCUTANEOUS EVERY 8 HOURS SCHEDULED
Status: DISCONTINUED | OUTPATIENT
Start: 2024-01-20 | End: 2024-01-20

## 2024-01-20 RX ORDER — HYDROCORTISONE 25 MG/G
OINTMENT TOPICAL 2 TIMES DAILY PRN
Status: DISCONTINUED | OUTPATIENT
Start: 2024-01-20 | End: 2024-01-24 | Stop reason: HOSPADM

## 2024-01-20 RX ORDER — ACETAMINOPHEN 325 MG/1
650 TABLET ORAL EVERY 4 HOURS PRN
Status: DISCONTINUED | OUTPATIENT
Start: 2024-01-20 | End: 2024-01-24 | Stop reason: HOSPADM

## 2024-01-20 RX ORDER — ENOXAPARIN SODIUM 100 MG/ML
60 INJECTION SUBCUTANEOUS EVERY 12 HOURS SCHEDULED
Status: DISCONTINUED | OUTPATIENT
Start: 2024-01-20 | End: 2024-01-21

## 2024-01-20 RX ORDER — METOPROLOL TARTRATE 1 MG/ML
5 INJECTION, SOLUTION INTRAVENOUS EVERY 6 HOURS PRN
Status: DISCONTINUED | OUTPATIENT
Start: 2024-01-20 | End: 2024-01-20

## 2024-01-20 RX ORDER — ACETAMINOPHEN 160 MG/5ML
650 SOLUTION ORAL EVERY 4 HOURS PRN
Status: DISCONTINUED | OUTPATIENT
Start: 2024-01-20 | End: 2024-01-24 | Stop reason: HOSPADM

## 2024-01-20 RX ORDER — FLUTICASONE FUROATE AND VILANTEROL 100; 25 UG/1; UG/1
1 POWDER RESPIRATORY (INHALATION)
Status: DISCONTINUED | OUTPATIENT
Start: 2024-01-20 | End: 2024-01-24 | Stop reason: HOSPADM

## 2024-01-20 RX ORDER — ACETAMINOPHEN 650 MG/1
650 SUPPOSITORY RECTAL EVERY 4 HOURS PRN
Status: DISCONTINUED | OUTPATIENT
Start: 2024-01-20 | End: 2024-01-24 | Stop reason: HOSPADM

## 2024-01-20 RX ORDER — FUROSEMIDE 10 MG/ML
40 INJECTION INTRAMUSCULAR; INTRAVENOUS ONCE
Status: COMPLETED | OUTPATIENT
Start: 2024-01-20 | End: 2024-01-20

## 2024-01-20 RX ADMIN — ENOXAPARIN SODIUM 60 MG: 60 INJECTION SUBCUTANEOUS at 21:09

## 2024-01-20 RX ADMIN — IOHEXOL 100 ML: 350 INJECTION, SOLUTION INTRAVENOUS at 09:26

## 2024-01-20 RX ADMIN — CEFTRIAXONE SODIUM 1 G: 1 INJECTION, SOLUTION INTRAVENOUS at 23:33

## 2024-01-20 RX ADMIN — HEPARIN SODIUM 7500 UNITS: 5000 INJECTION, SOLUTION INTRAVENOUS; SUBCUTANEOUS at 11:26

## 2024-01-20 RX ADMIN — FUROSEMIDE 40 MG: 10 INJECTION, SOLUTION INTRAMUSCULAR; INTRAVENOUS at 17:36

## 2024-01-20 RX ADMIN — CEFTRIAXONE SODIUM 1 G: 1 INJECTION, SOLUTION INTRAVENOUS at 00:21

## 2024-01-20 RX ADMIN — AZITHROMYCIN MONOHYDRATE 500 MG: 500 INJECTION, POWDER, LYOPHILIZED, FOR SOLUTION INTRAVENOUS at 23:33

## 2024-01-20 RX ADMIN — IPRATROPIUM BROMIDE AND ALBUTEROL SULFATE 3 ML: .5; 3 SOLUTION RESPIRATORY (INHALATION) at 19:14

## 2024-01-20 ASSESSMENT — ACTIVITIES OF DAILY LIVING (ADL)
PATIENT'S MEMORY ADEQUATE TO SAFELY COMPLETE DAILY ACTIVITIES?: YES
FEEDING YOURSELF: INDEPENDENT
JUDGMENT_ADEQUATE_SAFELY_COMPLETE_DAILY_ACTIVITIES: YES
BATHING: INDEPENDENT
WALKS IN HOME: INDEPENDENT
HEARING - RIGHT EAR: FUNCTIONAL
ADEQUATE_TO_COMPLETE_ADL: YES
GROOMING: INDEPENDENT
HEARING - LEFT EAR: FUNCTIONAL
DRESSING YOURSELF: INDEPENDENT
TOILETING: INDEPENDENT

## 2024-01-20 ASSESSMENT — COGNITIVE AND FUNCTIONAL STATUS - GENERAL
MOBILITY SCORE: 24
DAILY ACTIVITIY SCORE: 24

## 2024-01-20 ASSESSMENT — PAIN - FUNCTIONAL ASSESSMENT: PAIN_FUNCTIONAL_ASSESSMENT: 0-10

## 2024-01-20 ASSESSMENT — PAIN SCALES - GENERAL: PAINLEVEL_OUTOF10: 0 - NO PAIN

## 2024-01-20 NOTE — ED NOTES
Resident at bedside. Pt family concerned for increased lethargy and agitation. RT was notified to switch pt to highflow.     Anni Foley RN  01/20/24 [id number]

## 2024-01-20 NOTE — ED NOTES
Assumed care of pt at 0700. When checking on the patient family expresses concerns for increased agititation. Doctor was notified,     Anni Foley RN  01/20/24 0730

## 2024-01-20 NOTE — PROGRESS NOTES
"Pharmacy Medication History Review    Tawanda Lomeli is a 28 y.o. male admitted for Shortness of breath. Pharmacy reviewed the patient's [id number] medications and allergies for accuracy.    The list below reflectives the updated PTA list. Please review each medication in order reconciliation for additional clarification and justification.  Prior to Admission Medications   Prescriptions Last Dose Informant Patient Reported? Taking?   HumuLIN R U-500, Conc, Insulin 500 unit/mL CONCENTRATED injection 2024 Self No Yes   Sig: Inject 36 units 3 times  daily as directed   Patient taking differently: Inject 0.04 mL (20 Units) under the skin once daily at bedtime. Inject 36 units 3 times  daily as directed   Trulicity 3 mg/0.5 mL pen injector  Self No No   Sig: INJECT 3 MG SUBCUTANEOUSLY WEEKLY   atorvastatin (Lipitor) 40 mg tablet 2024 Self Yes Yes   Sig: Take 1 tablet (40 mg) by mouth once daily at bedtime.   dulaglutide (Trulicity) 1.5 mg/0.5 mL pen injector injection 1/15/2024 Self No Yes   Sig: Inject 1.5 mg under the skin 1 (one) time per week.   Patient taking differently: Inject 1.5 mg under the skin 1 (one) time per week. Every Monday   fenofibrate (Triglide) 160 mg tablet 2024 Self Yes Yes   Sig: Take 1 tablet (160 mg) by mouth once daily.   hydrocortisone 2.5 % ointment  Self Yes No   Si Application   insulin syringe-needle U-100 30G X 1/2\" 0.5 mL syringe 2024 Self Yes Yes   Sig: Use 3 a day with insulin .   lisinopril 20 mg tablet 2024 Self No Yes   Sig: Take 1 tablet (20 mg) by mouth once daily.   metFORMIN (Glucophage) 1,000 mg tablet 2024 Self Yes Yes   Sig: Take 1 tablet (1,000 mg) by mouth once daily in the evening. Take with meals.   metFORMIN (Glucophage) 500 mg tablet  Self Yes No   Sig: Take by mouth twice a day.   pravastatin (Pravachol) 40 mg tablet  Self Yes No   Sig: Take 1 tablet (40 mg) by mouth once daily.   triamcinolone (Kenalog) 0.1 % cream  Self Yes No "   Si Application      Facility-Administered Medications: None        The list below reflectives the updated allergy list. Please review each documented allergy for additional clarification and justification.  Allergies  Reviewed by Ann Berger CPhT on 2024        Severity Reactions Comments    Aspirin Medium Hives, Itching             Below are additional concerns with the patient's PTA list.      Ann Berger CPhT

## 2024-01-20 NOTE — PROGRESS NOTES
Pulmonary and Critical Care Attending Attestation:     Briefly this is a 29 y/o M with PMHx of DM2, Morbid obesity, ERIK (not compliant with CPAP) and diffuse psoriasis [facility]B. Pt worked up in the ED and was found to be hypoxic to the 70's on RA and tachycardic. He was placed on O2 and worked up and found to have uncompensated Respiratory acidosis. He was started on BPAP and his gases have failed to improve. Pt was initially admitted to medicine service and slated to go to SDU but ICU team was consulted due to worsening acute hypercapnic hypoxic respiratory failure and he was transferred to the ICU for further management.     #Acute Metabolic Encephalopathy  Likely 2/2 Hypercapnea   - Treat with BPAP as below     #Fluid Overload   - Echo ordered   - S/p Lasix 80mg   - Monitor UOP     #Acute hypercapnic hypoxic Respiratory failure   #RSV Positive   - Titrate Bpap; current settings 20/12   - ABGs with changes   - Full code and amenable for intubation if it comes to it   - Supportive care for RSV   - Procal is mildly elevated   - ATBx ordered: CTXx/ Azithro   - Follow cultures   - Urine Antigens, MRSA nares in process   - BPH     #DM2   - ENDO consult for U500 management       ICU CHECK LIST:   Antimicrobials: CTX/Azithro   Oxygen: 20/12 BPAP 70%  Drips:-    Feeding/Fluids: NPO    Analgesia: -   Sedation: -   Thromboprophylaxis: SCDs and Lovenox   Ulcer prophylaxis:-   Glycemic control: BGM with SSI and Endo c/s   Bowel care: PRN   Indwelling catheters: -  Lines: PIVs   Restraints: -   Dispo: MICU   Code Status: FC    I have reviewed and evaluated the most recent data and results, personally examined the patient, and formulated the plan of care as presented above. This patient was critically ill and required continued critical care treatment. Teaching and any separately billable procedures are not included in the time calculation.    Billing Provider Critical Care Time: 50  minutes    Yasir Falcon MD  Pulmonary & Critical Care Attending   [id number]    Please excuse any typographical or unwanted errors with in this documentation as voice recognition software was used to dictate this note.

## 2024-01-20 NOTE — CONSULTS
Subjective   Admission History:  Tawanda Lomeli is a 28 y.o. male who presents for Shortness of Breath.    HPI  This is a 28-year-old male who initially presented to [facility] on 1/19/2024 with shortness of breath and cough for the last several weeks since Norah time.  However, his family noticed that he had been having increased work of breathing and general malaise over the last few days.  Per mother, patient also complained of generalized abdominal pain although he denies any chest or abdominal pain now.    Upon arrival, patient was found to be hypoxic with SpO2 74% on RA after which he was placed on 6 L NC.  He was tachycardic in the 120s and tachypneic in the 30s.  Workup thus far notable for leukocytosis at 12.3 and metabolic alkalosis with bicarb 36.  He tested positive for RSV.  VBG in the ED showed pH 7.27/pCO2 77 after which he was started on BiPAP.  He was initially admitted to stepdown unit for acute hypoxic and hypercapnic respiratory failure in the setting of RSV pneumonia and untreated ERIK/OHS with suspected right-sided heart failure exacerbation.    CTA chest was obtained on admission, which was negative for PE but did show patchy multifocal infiltrates with scattered areas of partial consolidation.  He was empirically started on antibiotics    Past Medical History:   Morbid obesity  IDDM2 on U-500 c/b mild nonproliferative diabetic retinopathy, nephropathy  NAFLD  Psoriasis  ERIK, noncompliant on CPAP  Hyperlipidemia    Past Surgical History: Denies  Family History: Mother with thyroid disease and HTN.  Father with DM.  Paternal grandfather with pancreatic cancer.  Allergies: see above  Social history: Denies tobacco or illicit drug use.  Drinks up to 2-3 bottles of Jake whiskey/week with friends but has not had alcohol since 12/2023.  Lives at home with his mother.    Current Medications: see above    Review of Systems:  12-point ROS was reviewed and is negative, unless otherwise noted in  HPI    Objective   Vital Signs: Reviewed  Input/Output: Reviewed  Oxygen requirements: Reviewed  Ventilator Information: Reviewed     Physical Exam:   Constitutional: awake, alert, morbidly obese  ENMT: mucous membranes moist, conjunctivae clear  Head/Neck: normocephalic, atraumatic; supple, trachea midline  Respiratory/Thorax: diminished breath sounds but otherwise clear  Cardiovascular: tachycardic, regular rhythm, no murmur appreciated  Gastrointestinal: soft, nondistended, non-tender, bowel sounds appreciated  Extremities: palpable peripheral pulses, no edema  Neurological: AO x3, no focal deficits  Psychological: anxious, cooperative  Skin: scattered raised, dry, and nonerythematous lesions throughout BUE, BLE, and abdomen    Scheduled Medications:   atorvastatin, 40 mg, oral, Nightly  azithromycin, 500 mg, intravenous, q24h  cefTRIAXone, 1 g, intravenous, q24h  enoxaparin, 60 mg, subcutaneous, q12h JOSE  fenofibrate, 160 mg, oral, Daily  fluticasone furoate-vilanteroL, 1 puff, inhalation, Daily  insulin glargine, 10 Units, subcutaneous, Nightly  insulin lispro, 0-10 Units, subcutaneous, q4h  perflutren lipid microspheres, 0.5-10 mL of dilution, intravenous, Once in imaging    Continuous Medications:       PRN Medications:   PRN medications: acetaminophen **OR** acetaminophen **OR** acetaminophen, acetylcysteine, benzonatate, dextrose 10 % in water (D10W), dextrose, glucagon, hydrocortisone, ipratropium-albuteroL, melatonin, oxygen    Assessment/Plan   This is a 28-year-old male, with history of morbid obesity, IDDM2 on U500 C/B retinopathy and nephropathy, NAFLD, psoriasis, ERIK noncompliant on CPAP, and hyperlipidemia, who initially presented to [facility] on 1/19/2024 with shortness of breath and cough since Norah time.  He was found to be in acute hypoxic and hypercapnic respiratory failure in the setting of RSV pneumonia and suspected ADHF.  He has known ERIK for which he has been noncompliant on CPAP.  There  is concern for underlying OHS as well.  He was empirically started on antibiotics for RSV pneumonia.  Initial plan was for him to be admitted to stepdown unit; however, due to worsening hypercapnia despite multiple BiPAP adjustments, he was transferred to the ICU for further management.    Neurological:  #Acute metabolic encephalopathy  - Avoid sedating medications as patient is at high risk of intubation secondary to hypercapnic induced encephalopathy.    Cardiovascular:  #Acute decompensated heart failure, suspected  #Hyperlipidemia  Suspect right heart failure secondary to untreated ERIK/OHS.  S/p Lasix 80mg total  - Cardiology consulted. TTE pending.  - Not requiring pressors. Maintain MAP>65.  - Hold home Lisinopril (taken for diabetic nephropathy), Fenofibrate, Atorvastatin while NPO.    Respiratory:  #Acute hypoxic and hypercapnic respiratory failure  #RSV pneumonia  #ERIK, noncompliant on CPAP  #Concern for underlying OHS  #Right pleural effusion, small  Currently on BiPAP 20/12 at 70%  - Antibiotics as under ID. Procal 0.27. Will continue for 48hr. Consider discontinuing pending clinical condition. UAg, SCx pending.  - Repeat ABG scheduled for 1900    Gastrointestinal:  #NAFLD in setting of morbid obesity  - Diet: NPO while on BiPAP  - PPX: -    Endocrine:  #IDDM2 on U500 c/b mild nonproliferative retinopathy and nephropathy  Home regimen: U500 36 units TID AC, Trulicity 3mg qwk, Metformin 1000mg BID  Inpatient regimen: Lantus 10 QHS, SSI (0-10) Q4H  - Endocrinology consulted given use of U500    Renal:  #Chronic metabolic alkalosis in setting of untreated ERIK/OHS  Baseline SCr ~0.7-0.8  - S/p Lasix 80mg total this admission. Monitor UOP.    Hematological:  No acute issues.    Infection Disease:  #RSV pneumonia  Procal 0.27.  - Continue Azithromycin, Rocephin (1/19 - ). Consider discontinuing antibiotics in 48hr pending clinical condition.  - Obtain BCx. Follow UAg, SCx.    Skin/MSK:  #Psoriasis  Has not been  on any biologics. Scheduled to see rheum later this month.  - Wound care consulted.  - PRN topical hydrocortisone    ICU CHECKLIST:  Antimicrobials: Azithromycin, Rocephin  Oxygen: BiPAP 20/12 at 70%  Feeding: NPO  Fluids: -  Analgesia: -  Sedation: -  Thromboprophylaxis: SCDs and Lovenox  Ulcer prophylaxis: -  Glycemic control: BGM with Lantus 10 QHS, SSI  Bowel care: PRN   Indwelling catheters: -  Lines: PIVs  Dispo: MICU  Code Status: FULL    This is a preliminary note written by the resident. Please wait for attending addendum for finalization of note and recommendations.    Edmund Elliott, DO  Internal Medicine PGY3

## 2024-01-20 NOTE — H&P
"  History Of Present Illness  Tawanda Lomeli is a 28 y.o. male with a past medical history of diabetes presenting with shortness of breath.  The patient himself was very short of breath and it was difficult for him to discuss his medical history.  Fortunately his mother was present at bedside and corroborated his medical history.  She states that he was diagnosed with \"apnea\" as a child of 13 and that he was compliant with his breathing machine until the age of 18.  She states that he follows up with his diabetes related doctors, but that he has never had a pulmonologist.  Has a severe rash that is chronic in nature and stable.      Past Medical History  He has no past medical history on file.    Surgical History  He has no past surgical history on file.     Social History  He reports that he has never smoked. He has never used smokeless tobacco. He reports that he does not drink alcohol and does not use drugs.    Family History  No family history on file.     Allergies  Aspirin       Physical Exam  Constitutional: No respiratory acute distress, cooperative, answers questions appropriately, obese  Eyes: EOMI, clear sclera  ENMT: mucous membranes moist  Head/Neck: Neck supple, Trachea midline  Respiratory/Thorax: CTAB, no wheezes, poor inspiratory effort  Cardiovascular: Tachycardic, RRR, distal pulses 2+, no edema  Gastrointestinal: non tender, no palpable masses  Musculoskeletal: ROM intact, no gross deformity  Neurological: No focal deficits, normal sensation  Psychological: Appropriate mood and behavior  Skin: Warm and dry.  Severe and diffuse rash.  See attached photo in media section.     Last Recorded Vitals  /53   Pulse (!) 122   Temp 36.8 °C (98.2 °F)   Resp (!) 27   Wt (!) 159 kg (350 lb)   SpO2 95%     Relevant Results  Scheduled medications  acetylcysteine, 3 mL, nebulization, 4x daily  atorvastatin, 40 mg, oral, Nightly  azithromycin, 500 mg, intravenous, q24h  cefTRIAXone, 1 g, intravenous, " q24h  fenofibrate, 160 mg, oral, Daily  insulin glargine, 36 Units, subcutaneous, Nightly  insulin lispro, 0-10 Units, subcutaneous, q4h  pravastatin, 40 mg, oral, Daily        Continuous medications     PRN medications  PRN medications: acetaminophen **OR** acetaminophen **OR** acetaminophen, benzonatate, dextrose 10 % in water (D10W), dextrose, glucagon, hydrocortisone, ipratropium-albuteroL, melatonin, oxygen  Results for orders placed or performed during the hospital encounter of 01/19/24 (from the past 24 hour(s))   CBC and Auto Differential   Result Value Ref Range    WBC 10.4 4.4 - 11.3 x10*3/uL    nRBC 0.2 (H) 0.0 - 0.0 /100 WBCs    RBC 6.66 (H) 4.50 - 5.90 x10*6/uL    Hemoglobin 15.0 13.5 - 17.5 g/dL    Hematocrit 51.5 41.0 - 52.0 %    MCV 77 (L) 80 - 100 fL    MCH 22.5 (L) 26.0 - 34.0 pg    MCHC 29.1 (L) 32.0 - 36.0 g/dL    RDW 17.4 (H) 11.5 - 14.5 %    Platelets 257 150 - 450 x10*3/uL    Neutrophils % 81.3 40.0 - 80.0 %    Immature Granulocytes %, Automated 0.5 0.0 - 0.9 %    Lymphocytes % 8.5 13.0 - 44.0 %    Monocytes % 7.7 2.0 - 10.0 %    Eosinophils % 1.3 0.0 - 6.0 %    Basophils % 0.7 0.0 - 2.0 %    Neutrophils Absolute 8.47 (H) 1.20 - 7.70 x10*3/uL    Immature Granulocytes Absolute, Automated 0.05 0.00 - 0.70 x10*3/uL    Lymphocytes Absolute 0.88 (L) 1.20 - 4.80 x10*3/uL    Monocytes Absolute 0.80 0.10 - 1.00 x10*3/uL    Eosinophils Absolute 0.13 0.00 - 0.70 x10*3/uL    Basophils Absolute 0.07 0.00 - 0.10 x10*3/uL   Comprehensive metabolic panel   Result Value Ref Range    Glucose 150 (H) 74 - 99 mg/dL    Sodium 134 (L) 136 - 145 mmol/L    Potassium 4.3 3.5 - 5.3 mmol/L    Chloride 96 (L) 98 - 107 mmol/L    Bicarbonate 30 21 - 32 mmol/L    Anion Gap 12 10 - 20 mmol/L    Urea Nitrogen 15 6 - 23 mg/dL    Creatinine 1.18 0.50 - 1.30 mg/dL    eGFR 86 >60 mL/min/1.73m*2    Calcium 8.4 (L) 8.6 - 10.3 mg/dL    Albumin 4.0 3.4 - 5.0 g/dL    Alkaline Phosphatase 61 33 - 120 U/L    Total Protein 7.3 6.4 - 8.2  g/dL    AST 20 9 - 39 U/L    Bilirubin, Total 1.1 0.0 - 1.2 mg/dL    ALT 11 10 - 52 U/L   Troponin I, High Sensitivity   Result Value Ref Range    Troponin I, High Sensitivity 23 (H) 0 - 20 ng/L   B-Type Natriuretic Peptide   Result Value Ref Range     (H) 0 - 99 pg/mL   Blood Gas Venous   Result Value Ref Range    POCT pH, Venous 7.27 (L) 7.33 - 7.43 pH    POCT pCO2, Venous 77 (HH) 41 - 51 mm Hg    POCT pO2, Venous 29 (L) 35 - 45 mm Hg    POCT SO2, Venous 36 (L) 45 - 75 %    POCT Oxy Hemoglobin, Venous 35.1 (L) 45.0 - 75.0 %    POCT Base Excess, Venous 5.7 (H) -2.0 - 3.0 mmol/L    POCT HCO3 Calculated, Venous 35.4 (H) 22.0 - 26.0 mmol/L    Patient Temperature 37.0 degrees Celsius    FiO2 21 %    Critical Called By ESTHELA POWELL RRT     Critical Called To DR DOE     Critical Call Time 2132.0000     Critical Read Back Y    Beta Hydroxybutyrate   Result Value Ref Range    Beta-Hydroxybutyrate 0.16 0.02 - 0.27 mmol/L   Magnesium   Result Value Ref Range    Magnesium 1.55 (L) 1.60 - 2.40 mg/dL   Protime-INR   Result Value Ref Range    Protime 19.5 (H) 9.8 - 12.8 seconds    INR 1.7 (H) 0.9 - 1.1   aPTT   Result Value Ref Range    aPTT 37 27 - 38 seconds   D-Dimer, Quantitative Non VTE   Result Value Ref Range    D-Dimer Non VTE, Quant (ng/mL FEU) 540 (H) <=500 ng/mL FEU   Sars-CoV-2 and Influenza A/B PCR   Result Value Ref Range    Flu A Result Not Detected Not Detected    Flu B Result Not Detected Not Detected    Coronavirus 2019, PCR Not Detected Not Detected   Troponin I, High Sensitivity   Result Value Ref Range    Troponin I, High Sensitivity 27 (H) 0 - 20 ng/L     [unfilled]      Assessment/Plan   Assessment:  +Acute hypoxic hypercapnic respiratory failure  +Community-acquired pneumonia  +Tachypnea  -Azithromycin and Rocephin.  Pro-Chad ordered.  De-escalate antibiotics as able.  -Breathing treatments scheduled and as needed  -Sputum culture ordered  -Positive pressure breathing  -Consider consult to  pulmonology to establish care and manage outpatient respiratory as well as optimize inpatient respiratory needs  +Tachycardia  +Concern for acute exacerbation of heart failure, new onset  -Trialed with 40 IV Lasix in the emergency department.  Will monitor for response to diuretics.  Elevated BNP.  Based on my exam, I find fluid overload state to be very unlikely.  Consult was placed to cardiology from the emergency department.  Echocardiogram ordered.  Telemetry monitoring.    +Hyponatremia  +Hypochloremia  +Hypomagnesemia  -Will replenish and monitor daily  +Obesity  +Obesity hypoventilation syndrome -undiagnosed however likely given BMI >50  -CPAP ordered for nocturnal use.  CPAP as needed  +Diabetes mellitus  +Acanthosis nigricans  +Psoriasis  -Sliding scale coverage, hypoglycemia order set.  Long-acting coverage.  -As needed hydrocortisone    Diet: Carb controlled  DVT ppx: SCDs and heparin  IVF: None  Consults: Cards, Dietitian, heart failure navigator  Dispo: Tele    I have reviewed and interpreted all lab test imaging studies and documentations from other healthcare providers. Case to be discussed with attending, A&P above reflect tentative plan. Please await for final signature from attending physician on service.    Pamela Hernandez MD PGY-2  Please message me if you have any questions

## 2024-01-20 NOTE — ED PROVIDER NOTES
HPI   Chief Complaint   Patient presents with    Shortness of Breath       28-year-old male with a significant past medical history of obesity and diabetes mellitus presents today complaining of shortness of breath.  The patient states symptoms for the past 2 weeks which have progressively worsened.  He also states that his shortness of breath is worse with exertion.  He states a nonproductive cough.  Denies fevers or chest pain.  Denies myalgias.  No reports of sick contacts with similar symptoms.  He states he does have some concern that this may be related to his diabetes.  He states that he recently had a change of medication and has been without his medication due to financial reasons.  He states he has been checking his sugar reports that it has been within normal range.  He reports swelling to his lower extremities which extends into his abdomen                          Deport Coma Scale Score: 15                  Patient History   No past medical history on file.  No past surgical history on file.  No family history on file.  Social History     Tobacco Use    Smoking status: Never    Smokeless tobacco: Never   Substance Use Topics    Alcohol use: Never    Drug use: Never       Physical Exam   ED Triage Vitals [01/19/24 2034]   Temp Heart Rate Respirations BP   36.8 °C (98.2 °F) (!) 125 (!) 30 131/80      Pulse Ox Temp src Heart Rate Source Patient Position   (!) 74 % -- -- --      BP Location FiO2 (%)     -- --       Physical Exam  Vitals and nursing note reviewed.   Constitutional:       General: He is not in acute distress.     Appearance: Normal appearance. He is obese. He is not ill-appearing, toxic-appearing or diaphoretic.   HENT:      Head: Normocephalic.      Nose: Nose normal.      Mouth/Throat:      Mouth: Mucous membranes are moist.   Eyes:      Extraocular Movements: Extraocular movements intact.      Conjunctiva/sclera: Conjunctivae normal.   Cardiovascular:      Rate and Rhythm: Normal rate  and regular rhythm.      Pulses: Normal pulses.   Pulmonary:      Comments: No increased work of breathing or conversational dyspnea.  Lungs diminished with crackles at the bases  Abdominal:      General: Abdomen is flat. Bowel sounds are normal. There is no distension.      Palpations: Abdomen is soft.      Tenderness: There is no abdominal tenderness. There is no guarding or rebound.   Musculoskeletal:      Cervical back: Normal range of motion and neck supple.      Comments: Moderate to severe edema which appears to be symmetrical to the lower extremities extending into the abdomen   Skin:     General: Skin is warm and dry.      Capillary Refill: Capillary refill takes less than 2 seconds.   Neurological:      General: No focal deficit present.      Mental Status: He is alert and oriented to person, place, and time.   Psychiatric:         Mood and Affect: Mood normal.         Behavior: Behavior normal.         Thought Content: Thought content normal.         Judgment: Judgment normal.         ED Course & MDM   ED Course as of 01/19/24 2154 Fri Jan 19, 2024 2136 Point-of-care cardiac ultrasound showing decreased ejection fraction.  I do not see significant pericardial effusion.  VBG reviewed with significant elevation of pCO2.  Previously discussed with the patient likely need for BiPAP.  BiPAP ordered at this time. [JA]   2153 POCT pH, Venous(!): 7.27 [DS]   2153 MAGNESIUM(!): 1.55 [DS]   2153 BNP(!): 251 [DS]   2153 Troponin I, High Sensitivity(!): 23 [DS]   2153 SODIUM(!): 134 [DS]   2153 GLUCOSE(!): 150 [DS]   2153 Creatinine: 1.18 [DS]   2153 D-Dimer Non VTE, Quant (ng/mL FEU)(!): 540 [DS]   2153 WBC: 10.4 [DS]   2153 RBC(!): 6.66 [DS]   2154 HEMOGLOBIN: 15.0 [DS]      ED Course User Index  [DS] Anthony Segovia PA-C  [JA] Pillo Nathan DO         Diagnoses as of 01/19/24 2154   Shortness of breath   Hypoxia   Hypercapnia       Medical Decision Making  Patient's oxygen saturation was found to be 74% on  room air during the initial triage assessment.  He was taken immediately back to room 28 for further evaluation.  He was placed on oxygen via nasal cannula which improved his oxygen saturation to the mid 90s.  Patient on my examination appears to be in overt heart failure.  He has significant edema which appears to be symmetrical to his lower extremities extending to his abdomen.  He is a diabetic however he does have a history of noncompliance due to financial reasons.  Blood work was obtained and revealed no evidence of leukocytosis or concerning anemia.  D-dimer elevated at 540 however he is years negative and does not require further workup for pulmonary embolism.  BNP elevated at 251.  Initial high-sensitivity troponin elevated at 23 likely related to increased oxygen demand.  Venous pH 7.27.  Given these findings patient was placed on BiPAP.  Potassium was found to be within normal limits he was subsequently given Lasix.  Upon further discussion with the patient he does state he drinks often.  Given the overall presentation and history of present illness in combination with the point-of-care cardiac ultrasound I suspect that the patient may have a degree of dilated cardiomyopathy with a poor ejection fraction.  At this point I suspect that he is in overt heart failure.  Recommended admission for further evaluation which she was agreeable to.  Patient will be admitted to the stepdown service under medicine on-call        Procedure  Procedures     Anthony Segovia PA-C  01/19/24 2202       Anthony Segovia PA-C  01/19/24 2205

## 2024-01-20 NOTE — PROGRESS NOTES
Subjective   Tawanda Lomeli is a 28 y.o. male who presents with Shortness of Breath.    Patient seen this morning. He states that he feels anxious after wearing BiPAP overnight. He was switched to high flow NC this morning before getting a repeat ABG. He was still retaining CO2 and was placed back on BiPAP. RSV came back positive.    Objective   Vitals:    01/20/24 1230   BP: 118/58   Pulse: (!) 114   Resp: (!) 30   Temp:    SpO2: (!) 93%      Physical Exam  Physical Exam:  Constitutional: obese, lethargic, cooperative, A&Ox3  ENMT: mucous membranes moist, EOMI, conjunctivae clear  Head/Neck: normocephalic, atraumatic; supple, trachea midline  Respiratory/Thorax: patent airways, CTAB; no wheezes, rales, or rhonchi  Cardiovascular: RRR, no murmur  Gastrointestinal: soft, nondistended, non-tender, bowel sounds appreciated  Extremities: palpable peripheral pulses, no edema or cyanosis  Neurological: AO x3, no focal deficits  Psychological: appropriate mood and behavior  Skin: diffuse scaly patches/plaque over trunk, extremities and posterior neck    Assessment/Plan       Tawanda Lomeli is a 28-year-old male with PMH type 2 diabetes, ERIK, obesity, poorly controlled psoriasis who presented to [facility] with shortness of breath and is being treated for acute hypoxic respiratory secondary to CHF versus pneumonia and/or RSV positive.    Acute medical conditions:  #Acute hypoxic hypercapnic respiratory failure  #Obesity hypoventilation syndrome, likely given BNP greater than 50  #Community-acquired pneumonia  #RSV positive  #Tachycardia  VBG on admission showed pH 7.27, pCO2 77, bicarb 35.4  Patient diagnosed with ERIK at age 13, noncompliant with CPAP since age 18  CTA was ordered which showed no signs of PE; CXR showed bilateral patchy infiltrate  -Patient started on BiPAP  -Azithromycin and Rocephin; sputum cultures ordered, urine antigens pending  -Procal 0.27, lactate 0.7  -Pulmonology consulted, patient recommendations  -Repeat  ABG ordered for this afternoon    #Acute heart failure exacerbation, new onset  Given 40 IV Lasix in the emergency room  BNP elevated at 251  -Ordered echo and telemetry monitoring  -Cardiology consulted, appreciate recommendations    #Type 2 diabetes  Home medications include: Trulicity, metformin - held  -Ordered SSI #2 and Lanus 10U nightly    Chronic medical conditions:  #Psoriasis  Patient has appointment with dermatologist for this Friday regarding his psoriasis   -Hydrocortisone as needed    DVT PPX: SCDs, heparin  Diet: Diabetic diet  IVF: None  Code Status: FULL    This is a preliminary note written by the resident. Please wait for attending addendum for finalization of note and recommendations.    Feliciano Page DO, PhD  Internal Medicine PGY1

## 2024-01-21 LAB
ALBUMIN SERPL BCP-MCNC: 4 G/DL (ref 3.4–5)
ALP SERPL-CCNC: 41 U/L (ref 33–120)
ALT SERPL W P-5'-P-CCNC: 9 U/L (ref 10–52)
ANION GAP BLDA CALCULATED.4IONS-SCNC: 4 MMO/L (ref 10–25)
ANION GAP SERPL CALC-SCNC: 12 MMOL/L (ref 10–20)
AST SERPL W P-5'-P-CCNC: 21 U/L (ref 9–39)
BASE EXCESS BLDA CALC-SCNC: 9.2 MMOL/L (ref -2–3)
BILIRUB SERPL-MCNC: 1.1 MG/DL (ref 0–1.2)
BODY TEMPERATURE: 37 DEGREES CELSIUS
BUN SERPL-MCNC: 27 MG/DL (ref 6–23)
CA-I BLDA-SCNC: 1.12 MMOL/L (ref 1.1–1.33)
CALCIUM SERPL-MCNC: 8.5 MG/DL (ref 8.6–10.3)
CHLORIDE BLDA-SCNC: 95 MMOL/L (ref 98–107)
CHLORIDE SERPL-SCNC: 95 MMOL/L (ref 98–107)
CO2 SERPL-SCNC: 34 MMOL/L (ref 21–32)
CREAT SERPL-MCNC: 1.26 MG/DL (ref 0.5–1.3)
CREAT UR-MCNC: 143.2 MG/DL (ref 20–370)
CREAT UR-MCNC: 143.2 MG/DL (ref 20–370)
CRITICAL CALL TIME: 439
CRITICAL CALLED BY: ABNORMAL
CRITICAL CALLED TO: ABNORMAL
CRITICAL READ BACK: ABNORMAL
EGFRCR SERPLBLD CKD-EPI 2021: 80 ML/MIN/1.73M*2
ERYTHROCYTE [DISTWIDTH] IN BLOOD BY AUTOMATED COUNT: 17.3 % (ref 11.5–14.5)
GLUCOSE BLD MANUAL STRIP-MCNC: 109 MG/DL (ref 74–99)
GLUCOSE BLD MANUAL STRIP-MCNC: 133 MG/DL (ref 74–99)
GLUCOSE BLD MANUAL STRIP-MCNC: 133 MG/DL (ref 74–99)
GLUCOSE BLD MANUAL STRIP-MCNC: 159 MG/DL (ref 74–99)
GLUCOSE BLDA-MCNC: 147 MG/DL (ref 74–99)
GLUCOSE SERPL-MCNC: 129 MG/DL (ref 74–99)
HCO3 BLDA-SCNC: 38.6 MMOL/L (ref 22–26)
HCT VFR BLD AUTO: 48.2 % (ref 41–52)
HCT VFR BLD EST: 44 % (ref 41–52)
HGB BLD-MCNC: 13.5 G/DL (ref 13.5–17.5)
HGB BLDA-MCNC: 14.5 G/DL (ref 13.5–17.5)
INHALED O2 CONCENTRATION: 40 %
LACTATE BLDA-SCNC: 1.1 MMOL/L (ref 0.4–2)
M PNEUMO IGM SER IA-ACNC: 0.07 U/L
MAGNESIUM SERPL-MCNC: 1.91 MG/DL (ref 1.6–2.4)
MCH RBC QN AUTO: 22.2 PG (ref 26–34)
MCHC RBC AUTO-ENTMCNC: 28 G/DL (ref 32–36)
MCV RBC AUTO: 79 FL (ref 80–100)
NRBC BLD-RTO: 0 /100 WBCS (ref 0–0)
OXYHGB MFR BLDA: 93 % (ref 94–98)
PCO2 BLDA: 75 MM HG (ref 38–42)
PH BLDA: 7.32 PH (ref 7.38–7.42)
PLATELET # BLD AUTO: 235 X10*3/UL (ref 150–450)
PO2 BLDA: 73 MM HG (ref 85–95)
POTASSIUM BLDA-SCNC: 4.9 MMOL/L (ref 3.5–5.3)
POTASSIUM SERPL-SCNC: 4.8 MMOL/L (ref 3.5–5.3)
PROT SERPL-MCNC: 7.5 G/DL (ref 6.4–8.2)
RBC # BLD AUTO: 6.07 X10*6/UL (ref 4.5–5.9)
SAO2 % BLDA: 96 % (ref 94–100)
SODIUM BLDA-SCNC: 133 MMOL/L (ref 136–145)
SODIUM SERPL-SCNC: 136 MMOL/L (ref 136–145)
SODIUM UR-SCNC: 26 MMOL/L
SODIUM/CREAT UR-RTO: 18 MMOL/G CREAT
UREA/CREAT UR-SRTO: 7.9 G/G CREAT
UUN UR-MCNC: 1138 MG/DL
WBC # BLD AUTO: 9.4 X10*3/UL (ref 4.4–11.3)

## 2024-01-21 PROCEDURE — 84132 ASSAY OF SERUM POTASSIUM: CPT | Performed by: STUDENT IN AN ORGANIZED HEALTH CARE EDUCATION/TRAINING PROGRAM

## 2024-01-21 PROCEDURE — 99233 SBSQ HOSP IP/OBS HIGH 50: CPT

## 2024-01-21 PROCEDURE — 83735 ASSAY OF MAGNESIUM: CPT | Performed by: STUDENT IN AN ORGANIZED HEALTH CARE EDUCATION/TRAINING PROGRAM

## 2024-01-21 PROCEDURE — 99291 CRITICAL CARE FIRST HOUR: CPT | Performed by: STUDENT IN AN ORGANIZED HEALTH CARE EDUCATION/TRAINING PROGRAM

## 2024-01-21 PROCEDURE — 84540 ASSAY OF URINE/UREA-N: CPT | Performed by: STUDENT IN AN ORGANIZED HEALTH CARE EDUCATION/TRAINING PROGRAM

## 2024-01-21 PROCEDURE — 87449 NOS EACH ORGANISM AG IA: CPT | Mod: PARLAB | Performed by: STUDENT IN AN ORGANIZED HEALTH CARE EDUCATION/TRAINING PROGRAM

## 2024-01-21 PROCEDURE — 2500000004 HC RX 250 GENERAL PHARMACY W/ HCPCS (ALT 636 FOR OP/ED): Performed by: STUDENT IN AN ORGANIZED HEALTH CARE EDUCATION/TRAINING PROGRAM

## 2024-01-21 PROCEDURE — 84132 ASSAY OF SERUM POTASSIUM: CPT

## 2024-01-21 PROCEDURE — 94660 CPAP INITIATION&MGMT: CPT

## 2024-01-21 PROCEDURE — 87899 AGENT NOS ASSAY W/OPTIC: CPT | Mod: PARLAB | Performed by: STUDENT IN AN ORGANIZED HEALTH CARE EDUCATION/TRAINING PROGRAM

## 2024-01-21 PROCEDURE — 99223 1ST HOSP IP/OBS HIGH 75: CPT | Performed by: INTERNAL MEDICINE

## 2024-01-21 PROCEDURE — 2500000001 HC RX 250 WO HCPCS SELF ADMINISTERED DRUGS (ALT 637 FOR MEDICARE OP): Performed by: STUDENT IN AN ORGANIZED HEALTH CARE EDUCATION/TRAINING PROGRAM

## 2024-01-21 PROCEDURE — 94667 MNPJ CHEST WALL 1ST: CPT

## 2024-01-21 PROCEDURE — 82947 ASSAY GLUCOSE BLOOD QUANT: CPT

## 2024-01-21 PROCEDURE — 36600 WITHDRAWAL OF ARTERIAL BLOOD: CPT

## 2024-01-21 PROCEDURE — 2020000001 HC ICU ROOM DAILY

## 2024-01-21 PROCEDURE — 82570 ASSAY OF URINE CREATININE: CPT | Performed by: STUDENT IN AN ORGANIZED HEALTH CARE EDUCATION/TRAINING PROGRAM

## 2024-01-21 PROCEDURE — 85027 COMPLETE CBC AUTOMATED: CPT | Performed by: STUDENT IN AN ORGANIZED HEALTH CARE EDUCATION/TRAINING PROGRAM

## 2024-01-21 PROCEDURE — 2500000002 HC RX 250 W HCPCS SELF ADMINISTERED DRUGS (ALT 637 FOR MEDICARE OP, ALT 636 FOR OP/ED): Performed by: STUDENT IN AN ORGANIZED HEALTH CARE EDUCATION/TRAINING PROGRAM

## 2024-01-21 PROCEDURE — 36415 COLL VENOUS BLD VENIPUNCTURE: CPT | Performed by: STUDENT IN AN ORGANIZED HEALTH CARE EDUCATION/TRAINING PROGRAM

## 2024-01-21 RX ORDER — HEPARIN SODIUM 5000 [USP'U]/ML
7500 INJECTION, SOLUTION INTRAVENOUS; SUBCUTANEOUS EVERY 8 HOURS SCHEDULED
Status: DISCONTINUED | OUTPATIENT
Start: 2024-01-21 | End: 2024-01-24 | Stop reason: HOSPADM

## 2024-01-21 RX ORDER — INSULIN LISPRO 100 [IU]/ML
0-10 INJECTION, SOLUTION INTRAVENOUS; SUBCUTANEOUS
Status: DISCONTINUED | OUTPATIENT
Start: 2024-01-21 | End: 2024-01-24 | Stop reason: HOSPADM

## 2024-01-21 RX ADMIN — INSULIN LISPRO 2 UNITS: 100 INJECTION, SOLUTION INTRAVENOUS; SUBCUTANEOUS at 21:02

## 2024-01-21 RX ADMIN — HEPARIN SODIUM 7500 UNITS: 5000 INJECTION INTRAVENOUS; SUBCUTANEOUS at 21:00

## 2024-01-21 RX ADMIN — CEFTRIAXONE SODIUM 1 G: 1 INJECTION, SOLUTION INTRAVENOUS at 23:50

## 2024-01-21 RX ADMIN — FENOFIBRATE 160 MG: 160 TABLET, FILM COATED ORAL at 09:00

## 2024-01-21 RX ADMIN — INSULIN GLARGINE 10 UNITS: 100 INJECTION, SOLUTION SUBCUTANEOUS at 21:03

## 2024-01-21 RX ADMIN — AZITHROMYCIN MONOHYDRATE 500 MG: 500 INJECTION, POWDER, LYOPHILIZED, FOR SOLUTION INTRAVENOUS at 23:51

## 2024-01-21 RX ADMIN — ATORVASTATIN CALCIUM 40 MG: 40 TABLET, FILM COATED ORAL at 21:02

## 2024-01-21 RX ADMIN — ENOXAPARIN SODIUM 60 MG: 60 INJECTION SUBCUTANEOUS at 09:00

## 2024-01-21 RX ADMIN — HEPARIN SODIUM 7500 UNITS: 5000 INJECTION INTRAVENOUS; SUBCUTANEOUS at 13:57

## 2024-01-21 SDOH — HEALTH STABILITY: MENTAL HEALTH: HOW OFTEN DO YOU HAVE A DRINK CONTAINING ALCOHOL?: 2-4 TIMES A MONTH

## 2024-01-21 SDOH — HEALTH STABILITY: MENTAL HEALTH: HOW OFTEN DO YOU HAVE 6 OR MORE DRINKS ON ONE OCCASION?: LESS THAN MONTHLY

## 2024-01-21 SDOH — HEALTH STABILITY: MENTAL HEALTH: HOW MANY STANDARD DRINKS CONTAINING ALCOHOL DO YOU HAVE ON A TYPICAL DAY?: 3 OR 4

## 2024-01-21 ASSESSMENT — PAIN SCALES - GENERAL
PAINLEVEL_OUTOF10: 0 - NO PAIN

## 2024-01-21 ASSESSMENT — PAIN - FUNCTIONAL ASSESSMENT
PAIN_FUNCTIONAL_ASSESSMENT: 0-10

## 2024-01-21 ASSESSMENT — COGNITIVE AND FUNCTIONAL STATUS - GENERAL
MOBILITY SCORE: 24
DAILY ACTIVITIY SCORE: 24
PATIENT BASELINE BEDBOUND: NO

## 2024-01-21 ASSESSMENT — ACTIVITIES OF DAILY LIVING (ADL): LACK_OF_TRANSPORTATION: NO

## 2024-01-21 ASSESSMENT — LIFESTYLE VARIABLES
SKIP TO QUESTIONS 9-10: 0
AUDIT-C TOTAL SCORE: 4

## 2024-01-21 NOTE — CONSULTS
Endocrinology Initial Inpatient Consult Note     PATIENT NAME: Tawanda Lomeli  MRN: [id number]  DATE: 1/21/2024    CONSULTING PHYSICIAN: Dr. KHLOE Falcon  REASON FOR CONSULT: Uncontrolled T2DM.       History of Presenting Illness     28 y M w. PMHx of:      # Uncontrolled T2DM      # NAFLD      # Hypertension      # Dyslipidemia      # Class III Obesity      # ERIK    Patient presented to [facility] on 1/19 complaining of worsening dyspnea.  States that for the last couple days he has been extremely short of breath.  Even with short distances.  He has been dyspneic at rest as well.  Patient reports that he was diagnosed with sleep apnea at the age of 13 and he states that he wears a CPAP.  Patient states he is a follow with pulmonology.  He reports that he does have a rash as well.  He does have a cough which is nonproductive.    In the ER, CBC was remarkable for a MCV of 77.  CMP showed elevated glucose 150 mg/dL.  The patient's troponin was found to be 23.  BNP was elevated to 251.  The patient's VBG showed a respiratory acidosis with a pH of 7.27.  Beta-hydroxybutyrate was normal.  Patient's INR was found to be 1.7.  D-dimer was found to be 540.  Patient had a lipid panel drawn which showed a LDL cholesterol of 32 with triglycerides of 68.  His procalcitonin level was elevated to 0.27.  Serum lactate was found to be 0.7.  Streptococcus and Legionella antigens are pending.  Patient's RSV PCR came back positive.  Plain radiograph of the chest showed a enlarged cardiomediastinal silhouette with small lung volumes and patchy airspace opacities in the lower lobes.  The patient underwent a Doppler of his bilateral lower extremities which showed no DVT.  He underwent a CT PE which showed no pulmonary embolus but it was not a very conclusive study.  The patient did have patchy multifocal infiltrates in the lungs.  He was subsequently admitted to the medical intensive care unit for management of his acute hypercarbic respiratory  "acidosis in the setting of RSV infection.    In regards to his diabetes, the patient reports he follows with endocrinology.  He reports he is on metformin 1000 mg twice daily.  He also states that he is on dulaglutide.  The patient reports that he takes insulin U-500.  He takes 36 units 3 times a day with meals.  The patient states that he checks fingerstick glucoses.  He does not use a continuous glucose monitor.  He has tried this in the past and states that it was not accurate.  His family members at the bedside.      Review of Systems (Bold if Positive)     General: Fevers, Chills, Weight Loss, or Night Sweats  HEENT: Headaches or Blurry Vision  Cardiovascular: CP, Palpitations, Diaphoresis, or Peripheral Edema  Respiratory: Cough, Shortness of Breath, or Hemoptysis  Gastrointestinal: N/V, Abdominal Pain, Constipation, Diarrhea, Melena, Hematochezia  Genitourinary: Polyruia, Dysuria, Urgency   Endo: Polydipsia, Heat/Cold Intolerance, Hair/Skin/Nail Changes  Rheum: Joint Pain   MSK: LBP, Muscle Pain  Psych: Anxiety, Depression      Physical Examination     /73   Pulse 100   Temp 36 °C (96.8 °F) (Temporal)   Resp 25   Ht 1.702 m (5' 7\")   Wt (!) 155 kg (342 lb 9.5 oz)   SpO2 (!) 89%   BMI 53.66 kg/m²   General: No acute distress. A&Ox3.  On noninvasive positive pressure ventilation.  HEENT: PERRLA. EOMi. Ø Exophthalmos.  Acanthosis nigricans on the lower lids.  Neck: No LAD.  Large neck circumference.  Significant acanthosis nigricans.  Thyroid: 20 g.  Ø Bruit  CV: Normal rate and rhythm. No murmurs or rubs auscultated.   Resp: CTA b/l. No wheezing or crackles.   Abdomen: Soft, nontender, nondistended obese abdomen. BSx4.   Extremities: No pedal edema b/l.  Neuro: CN II-XII Grossly Intact. Ø Tremor. Brisk Reflexes.   Psych: Appropriate affect  Skin: No apparent rashes      Past Medical / Surgical / Family / Social History     No past medical history on file.  No past surgical history on file.  No " family history on file.  Social History     Socioeconomic History    Marital status: Single     Spouse name: Not on file    Number of children: Not on file    Years of education: Not on file    Highest education level: Not on file   Occupational History    Not on file   Tobacco Use    Smoking status: Never    Smokeless tobacco: Never   Substance and Sexual Activity    Alcohol use: Never    Drug use: Never    Sexual activity: Not on file   Other Topics Concern    Not on file   Social History Narrative    Not on file     Social Determinants of Health     Financial Resource Strain: Low Risk  (1/21/2024)    Overall Financial Resource Strain (CARDIA)     Difficulty of Paying Living Expenses: Not hard at all   Food Insecurity: Not on file   Transportation Needs: No Transportation Needs (1/21/2024)    PRAPARE - Transportation     Lack of Transportation (Medical): No     Lack of Transportation (Non-Medical): No   Physical Activity: Not on file   Stress: Not on file   Social Connections: Not on file   Intimate Partner Violence: Not on file   Housing Stability: Low Risk  (1/21/2024)    Housing Stability Vital Sign     Unable to Pay for Housing in the Last Year: No     Number of Places Lived in the Last Year: 1     Unstable Housing in the Last Year: No       Medications & Allergies     MAR and PTA Meds Reviewed     Allergies   Allergen Reactions    Aspirin Hives and Itching       Data     Recent Labs and Imaging Reviewed    Date  PreBreakfast Pre Lunch Pre Dinner Bedtime 3AM                                        Assessment / Plan       # Uncontrolled Type 2 Diabetes Mellitus  Home Regimen: Metformin 1 g BID.  Dulaglutide 3 mg qWK. U500 36-36-36.  Hemoglobin A1c (10/24): 6.3%  Nutrition: NPO.    Sugars have been at goal for the last 48 hours.  The patient has been NPO.  I do see that glargine was added by the primary team.  I do not think this is a bad intervention.  We can continue him on sliding scale every 4 hours.  I will  start him on prandial insulin once he is allowed to eat as I suspect he will have some hyperglycemia from the same.    # Acute Hypercarbic Resp Fx: Likely secondary to RSV infection.  The patient is on noninvasive positive pressure ventilation.  Not on steroids.    # Hypertension: On lisinopril 20 mg daily at home.  This has been held as an inpatient.    # Dyslipidemia: Reviewed his lipid panel on admission.  His LDL cholesterol as well as triglycerides are at goal.  The patient is maintained on atorvastatin 40 mg as well as fenofibrate 160 mg.  I reviewed his endocrinology note.    # NAFLD: Agree with use of a GLP-1 receptor analog.  Consider addition of pioglitazone.  The patient does have many signs and symptoms of insulin resistance on exam.  Pioglitazone has also been shown to decrease histological findings of NAFLD in patients with T2DM.    # Obesity: Consider 1 mg overnight dexamethasone suppression test to rule out Cushing's as an outpatient.       Jose R Falcon, DO  Endocrinology, Diabetes, and Metabolism    Available via [employer] Messenger    Please excuse any typographical or unwanted errors within this documentation as voice recognition software was used to dictate this note.

## 2024-01-21 NOTE — PROGRESS NOTES
Subjective   Tawanda Lomeli is a 28 y.o. male who presents with Shortness of Breath.    Patient seen this morning on NIPPV with mild improvement in ABG. He was transferred to the ICU yesterday due to worsening hypercapnic hypoxic respiratory failure. Patient's mother and sister at bedside. Patient requesting mouth swabs due to dry mouth.    Objective   Vitals:    01/21/24 1142   BP:    Pulse:    Resp:    Temp:    SpO2: 93%      Physical Exam  Physical Exam:  Constitutional: obese, lethargic, cooperative, A&Ox3  ENMT: mucous membranes moist, EOMI, conjunctivae clear  Head/Neck: normocephalic, atraumatic; supple, trachea midline  Respiratory/Thorax: patent airways, CTAB; no wheezes, rales, or rhonchi  Cardiovascular: RRR, no murmur  Gastrointestinal: soft, nondistended, non-tender, bowel sounds appreciated  Extremities: palpable peripheral pulses, no edema or cyanosis  Neurological: AO x3, no focal deficits  Psychological: appropriate mood and behavior  Skin: diffuse scaly patches/plaque over trunk, extremities and posterior neck    Assessment/Plan       Tawanda Lomeli is a 28-year-old male with PMH type 2 diabetes, ERIK, obesity, poorly controlled psoriasis who presented to [facility] with shortness of breath and is being treated for acute hypoxic respiratory secondary to CHF versus pneumonia and/or RSV positive.    Acute medical conditions:  #Acute hypoxic hypercapnic respiratory failure  #OHS/ERIK noncompliant on CPAP  #RSV pneumonia  VBG on admission showed pH 7.27, pCO2 77, bicarb 35.4  Patient diagnosed with ERIK at age 13, noncompliant with CPAP since age 18  CTA was ordered which showed no signs of PE; CXR showed bilateral patchy infiltrate  -Azithromycin and Rocephin; sputum cultures ordered, urine antigens pending  -Procal 0.27, lactate 0.7  -Repeat ABG this morning showed mild improvement; pH 7.32, PCO2 75, HCO3 38.6  -Continue NIPPV per ICU team    #Acute decompensated heart failure suspected, new onset  Given total 80  mg IV lasix on admission 1/20  BNP elevated at 251  -Cardiology consulted, hold lasix at this time, will continue to follow  -Echo pending    #T2DM  #Morbid obesity  #NAFLD 2/2 morbid obesity   Last HAC1 6.3 on 10/24/23  Home medications include: Trulicity, metformin - held  -Endocrinology consulted, appreciate recommendations  -Ordered SSI #2 and Lanus 10U nightly    Chronic medical conditions:  #Psoriasis  Patient has appointment with dermatologist for this Friday regarding his psoriasis   -Hydrocortisone as needed, wound care consulted    DVT PPX: SCDs, heparin  Diet: CLD  IVF: None  Code Status: FULL    This is a preliminary note written by the resident. Please wait for attending addendum for finalization of note and recommendations.    Feliciano Page DO, PhD  Internal Medicine PGY1

## 2024-01-21 NOTE — CONSULTS
Consults  History Of Present Illness:    Tawanda Lomeli is a 28 y.o. male presenting with SOB.    Patient is a 28-year-old male with a history of diabetes, ERIK, hyperlipidemia, morbid obesity who presents with shortness of breath and cough.  Patient was found to be hypoxic and placed on oxygen.  He was noted to be tachycardic and tachypneic.  He developed respiratory failure requiring BiPAP.  Cardiology is consulted for further evaluation of heart failure.  Patient denies any chest discomfort.  States his breathing is improved.  Denies any palpitations, lightheadedness, syncope.  Denies any orthopnea, PND.  Does admit to lower extremity edema.    Ultrasound of the lower extremities negative for DVT.  Echocardiogram is pending.  ECG demonstrates sinus tachycardia with low voltage.     Last Recorded Vitals:  Vitals:    01/21/24 0700 01/21/24 0729 01/21/24 0800 01/21/24 1100   BP: 117/75  126/86    Pulse: 97  100    Resp: 25 25 25 25   Temp:   36 °C (96.8 °F)    TempSrc:   Temporal    SpO2: 98%  97%    Weight:   (!) 155 kg (342 lb 9.5 oz)    Height:           Last Labs:  CBC - 1/21/2024:  5:34 AM  9.4 13.5 235    48.2      CMP - 1/21/2024:  5:34 AM  8.5 7.5 21 --- 1.1   4.2 4.0 9 41      PTT - 1/19/2024:  9:17 PM  1.7   19.5 37     Troponin I, High Sensitivity   Date/Time Value Ref Range Status   01/19/2024 10:21 PM 27 (H) 0 - 20 ng/L Final   01/19/2024 09:17 PM 23 (H) 0 - 20 ng/L Final     BNP   Date/Time Value Ref Range Status   01/19/2024 09:17  (H) 0 - 99 pg/mL Final     Hemoglobin A1C   Date/Time Value Ref Range Status   12/13/2023 09:13 AM 8.0 (H) see below % Final   10/14/2022 10:34 AM 6.3 (A) % Final     Comment:          Diagnosis of Diabetes-Adults   Non-Diabetic: < or = 5.6%   Increased risk for developing diabetes: 5.7-6.4%   Diagnostic of diabetes: > or = 6.5%  .       Monitoring of Diabetes                Age (y)     Therapeutic Goal (%)   Adults:          >18           <7.0   Pediatrics:    13-18            <7.5                   7-12           <8.0                   0- 6            7.5-8.5   American Diabetes Association. Diabetes Care 33(S1), Jan 2010.     06/24/2022 10:00 AM 6.6 (A) % Final     Comment:          Diagnosis of Diabetes-Adults   Non-Diabetic: < or = 5.6%   Increased risk for developing diabetes: 5.7-6.4%   Diagnostic of diabetes: > or = 6.5%  .       Monitoring of Diabetes                Age (y)     Therapeutic Goal (%)   Adults:          >18           <7.0   Pediatrics:    13-18           <7.5                   7-12           <8.0                   0- 6            7.5-8.5   American Diabetes Association. Diabetes Care 33(S1), Jan 2010.       LDL Calculated   Date/Time Value Ref Range Status   01/19/2024 10:21 PM 32 <=99 mg/dL Final     Comment:                                 Near   Borderline      AGE      Desirable  Optimal    High     High     Very High     0-19 Y     0 - 109     ---    110-129   >/= 130     ----    20-24 Y     0 - 119     ---    120-159   >/= 160     ----      >24 Y     0 -  99   100-129  130-159   160-189     >/=190     12/13/2023 09:13 AM 94 <=99 mg/dL Final     Comment:                                 Near   Borderline      AGE      Desirable  Optimal    High     High     Very High     0-19 Y     0 - 109     ---    110-129   >/= 130     ----    20-24 Y     0 - 119     ---    120-159   >/= 160     ----      >24 Y     0 -  99   100-129  130-159   160-189     >/=190       VLDL   Date/Time Value Ref Range Status   01/19/2024 10:21 PM 14 0 - 40 mg/dL Final   12/13/2023 09:13 AM 21 0 - 40 mg/dL Final   06/24/2022 10:00 AM 29 0 - 40 mg/dL Final   07/16/2021 09:40 AM 66 (H) 0 - 40 mg/dL Final   03/15/2021 10:16 AM SEE COMMENT 0 - 40 mg/dL Final     Comment:       Unable to calculate VLDL.      Last I/O:  I/O last 3 completed shifts:  In: 600 (3.9 mL/kg) [IV Piggyback:600]  Out: 1450 (9.3 mL/kg) [Urine:1450 (0.3 mL/kg/hr)]  Weight: 155.4 kg     Past Cardiology Tests (Last 3  "Years):  EKG:  No results found for this or any previous visit from the past 1095 days.    Echo:  No results found for this or any previous visit from the past 1095 days.    Ejection Fractions:  No results found for: \"EF\"  Cath:  No results found for this or any previous visit from the past 1095 days.    Stress Test:  No results found for this or any previous visit from the past 1095 days.    Cardiac Imaging:  No results found for this or any previous visit from the past 1095 days.      Past Medical History:  He has no past medical history on file.    Past Surgical History:  He has no past surgical history on file.      Social History:  He reports that he has never smoked. He has never used smokeless tobacco. He reports that he does not drink alcohol and does not use drugs.    Family History:  No family history on file.     Allergies:  Aspirin    Inpatient Medications:  Scheduled medications   Medication Dose Route Frequency    atorvastatin  40 mg oral Nightly    azithromycin  500 mg intravenous q24h    cefTRIAXone  1 g intravenous q24h    enoxaparin  60 mg subcutaneous q12h JOSE    fenofibrate  160 mg oral Daily    fluticasone furoate-vilanteroL  1 puff inhalation Daily    insulin glargine  10 Units subcutaneous Nightly    insulin lispro  0-10 Units subcutaneous q4h    perflutren lipid microspheres  0.5-10 mL of dilution intravenous Once in imaging     PRN medications   Medication    acetaminophen    Or    acetaminophen    Or    acetaminophen    acetylcysteine    benzonatate    dextrose 10 % in water (D10W)    dextrose    glucagon    hydrocortisone    ipratropium-albuteroL    melatonin    oxygen     Continuous Medications   Medication Dose Last Rate     Outpatient Medications:  Current Outpatient Medications   Medication Instructions    atorvastatin (LIPITOR) 40 mg, oral, Nightly    fenofibrate (TRIGLIDE) 160 mg, oral, Daily    HumuLIN R U-500, Conc, Insulin 500 unit/mL CONCENTRATED injection Inject 36 units 3 times " " daily as directed    hydrocortisone 2.5 % ointment 1 Application    insulin syringe-needle U-100 30G X 1/2\" 0.5 mL syringe Use 3 a day with insulin .    lisinopril 20 mg, oral, Daily    metFORMIN (Glucophage) 500 mg tablet oral, 2 times daily    metFORMIN (GLUCOPHAGE) 1,000 mg, oral, Daily with evening meal    pravastatin (PRAVACHOL) 40 mg, oral, Daily RT    triamcinolone (Kenalog) 0.1 % cream 1 Application    Trulicity 3 mg, subcutaneous, Weekly    Trulicity 1.5 mg, subcutaneous, Weekly       Physical Exam:  Physical Exam  Vitals reviewed.   Constitutional:       Appearance: Normal appearance.   HENT:      Head: Normocephalic and atraumatic.      Mouth/Throat:      Mouth: Mucous membranes are moist.      Pharynx: Oropharynx is clear.   Cardiovascular:      Rate and Rhythm: Normal rate and regular rhythm.      Pulses: Normal pulses.      Heart sounds: Normal heart sounds.   Pulmonary:      Effort: Pulmonary effort is normal.      Breath sounds: Examination of the right-lower field reveals decreased breath sounds. Examination of the left-lower field reveals decreased breath sounds. Decreased breath sounds present.   Abdominal:      General: Bowel sounds are normal.      Palpations: Abdomen is soft.   Musculoskeletal:      Cervical back: Neck supple.      Right lower le+ Edema present.      Left lower le+ Edema present.   Skin:     General: Skin is warm and dry.   Neurological:      General: No focal deficit present.      Mental Status: He is alert.   Psychiatric:         Mood and Affect: Mood normal.         Behavior: Behavior normal.           Assessment/Plan   2024  1.  Suspected right-sided heart failure: He was given a dose of Lasix in the emergency department.  Difficult to assess volume status given body habitus.  Will hold off on additional Lasix at this time.  Echocardiogram is pending.  Further recommendations to follow after echocardiogram is performed.  2.  Respiratory failure  3.  RSV " infection  4.  ERIK  5.  Morbid obesity  6.  Diabetes    Peripheral IV 01/19/24 20 G Distal;Right;Upper Arm (Active)   Site Assessment Clean;Dry;Intact 01/21/24 0800   Dressing Type Transparent 01/21/24 0800   Line Status Flushed 01/21/24 0800   Cap Change Cap changed 01/21/24 0800   Dressing Status Clean;Dry;Occlusive 01/21/24 0800   Number of days: 2       Code Status:  Full Code    Alfredo Fam MD

## 2024-01-21 NOTE — PROGRESS NOTES
Subjective   Seen on AVAPS this AM. Denies any SOB, chest pain, or abdominal pain.    Patient was transitioned from AVAPS to Airvo 40/40. Started on clear liquids. Advance as tolerated. Will adjust insulin needs accordingly. Hold off on further diuresis given LILLIAM. TTE pending. Consider discontinuing antibiotics tomorrow.    Objective   Physical Exam:   Constitutional: awake, alert, morbidly obese  ENMT: mucous membranes moist, conjunctivae clear  Head/Neck: normocephalic, atraumatic; supple, trachea midline  Respiratory/Thorax: diminished breath sounds but otherwise clear  Cardiovascular: RRR, no murmur appreciated  Gastrointestinal: soft, nondistended, non-tender, bowel sounds appreciated  Extremities: palpable peripheral pulses, no edema  Neurological: AO x3, no focal deficits  Psychological: pleasant, cooperative  Skin: scattered raised, dry, and nonerythematous lesions throughout BUE, BLE, and abdomen    Scheduled Medications:   atorvastatin, 40 mg, oral, Nightly  azithromycin, 500 mg, intravenous, q24h  cefTRIAXone, 1 g, intravenous, q24h  fenofibrate, 160 mg, oral, Daily  fluticasone furoate-vilanteroL, 1 puff, inhalation, Daily  heparin (porcine), 7,500 Units, subcutaneous, q8h JOSE  insulin glargine, 10 Units, subcutaneous, Nightly  insulin lispro, 0-10 Units, subcutaneous, q4h  perflutren lipid microspheres, 0.5-10 mL of dilution, intravenous, Once in imaging       Continuous Medications:       PRN Medications:   PRN medications: acetaminophen **OR** acetaminophen **OR** acetaminophen, acetylcysteine, benzonatate, dextrose 10 % in water (D10W), dextrose, glucagon, hydrocortisone, ipratropium-albuteroL, melatonin, oxygen    Assessment/Plan   This is a 28-year-old male, with history of morbid obesity, IDDM2 on U500 C/B retinopathy and nephropathy, NAFLD, psoriasis, ERIK noncompliant on CPAP, and hyperlipidemia, who initially presented to [facility] on 1/19/2024 with shortness of breath and cough since [place]  time.  He was found to be in acute hypoxic and hypercapnic respiratory failure in the setting of RSV pneumonia and suspected ADHF.  He has known ERIK for which he has been noncompliant on CPAP.  There is concern for underlying OHS as well.  He was empirically started on antibiotics for RSV pneumonia.  Initial plan was for him to be admitted to stepdown unit; however, due to worsening hypercapnia despite multiple BiPAP adjustments, he was transferred to the ICU for further management.     Neurological:  #Acute metabolic encephalopathy  - Avoid sedating medications as patient is at high risk of intubation secondary to hypercapnea     Cardiovascular:  #Acute decompensated heart failure, suspected  #Hyperlipidemia  Suspect right heart failure secondary to untreated ERIK/OHS.  - S/p Lasix 80mg total this admission. Hold off on further diuresis given LILLIAM.  - Cardiology following. TTE pending.  - Not requiring pressors. Maintain MAP>65.  - Continue home Fenofibrate, Atorvastatin. Hold home Lisinopril given LILLIAM.     Respiratory:  #Acute hypoxic and hypercapnic respiratory failure  #RSV pneumonia  #ERIK, noncompliant on CPAP  #Concern for underlying OHS  #Right pleural effusion, small  Transitioned off AVAPS to Airvo 40/40.  - Antibiotics as under ID. Procal 0.27. Consider discontinuing on 1/22. UAg, SCx pending.  - Continue AVAPS QHS and PRN     Gastrointestinal:  #NAFLD in setting of morbid obesity  - Diet: Clear liquids, ADAT  - PPX: -     Endocrine:  #IDDM2 on U500 c/b mild nonproliferative retinopathy and nephropathy  Home regimen: U500 36 units TID AC, Trulicity 3mg qwk, Metformin 1000mg BID  Inpatient regimen: Lantus 10 QHS, SSI (0-10) TID AC and QHS  - Endocrinology following  - Will adjust insulin needs accordingly as diet is advanced     Renal:  #LILLIAM, suspect prerenal  #Chronic metabolic alkalosis in setting of untreated ERIK/OHS  Baseline SCr ~0.7-0.8  - S/p Lasix 80mg total this admission. Hold off on further  diuresis.  - Hold home Lisinopril given LILLIAM. Avoid nephrotoxic meds.  - Check urine lytes for FEurea     Hematological:  No acute issues.     Infection Disease:  #RSV pneumonia  Procal 0.27.  - Continue Azithromycin, Rocephin (1/19 - ). Consider discontinuing antibiotics on 1/22.  - Follow UAg, BCx.     Skin/MSK:  #Psoriasis  Has not been on any biologics. Scheduled to see rheum later this month.  - Wound care consulted.  - PRN topical hydrocortisone     ICU CHECKLIST:  Antimicrobials: Azithromycin, Rocephin (1/19 - )  Oxygen: Airvo 40/40, AVAPS QHS  Feeding: Clear liquids, ADAT  Fluids: -  Analgesia: -  Sedation: -  Thromboprophylaxis: SCDs and SQH  Ulcer prophylaxis: -  Glycemic control: BGM with Lantus 10 QHS, SSI  Bowel care: PRN   Indwelling catheters: -  Lines: PIVs  Dispo: MICU - Anticipate stable for transfer to telemetry within next 24hr.  Code Status: FULL     This is a preliminary note written by the resident. Please wait for attending addendum for finalization of note and recommendations.     Edmund Elliott, DO  Internal Medicine PGY3

## 2024-01-22 ENCOUNTER — APPOINTMENT (OUTPATIENT)
Dept: CARDIOLOGY | Facility: HOSPITAL | Age: 29
DRG: 193 | End: 2024-01-22
Payer: COMMERCIAL

## 2024-01-22 LAB
ALBUMIN SERPL BCP-MCNC: 3.8 G/DL (ref 3.4–5)
ALP SERPL-CCNC: 36 U/L (ref 33–120)
ALT SERPL W P-5'-P-CCNC: 9 U/L (ref 10–52)
ANION GAP SERPL CALC-SCNC: 7 MMOL/L (ref 10–20)
AST SERPL W P-5'-P-CCNC: 22 U/L (ref 9–39)
BILIRUB SERPL-MCNC: 1.1 MG/DL (ref 0–1.2)
BUN SERPL-MCNC: 20 MG/DL (ref 6–23)
CALCIUM SERPL-MCNC: 8.2 MG/DL (ref 8.6–10.3)
CHLORIDE SERPL-SCNC: 96 MMOL/L (ref 98–107)
CO2 SERPL-SCNC: 38 MMOL/L (ref 21–32)
CREAT SERPL-MCNC: 1.15 MG/DL (ref 0.5–1.3)
EGFRCR SERPLBLD CKD-EPI 2021: 89 ML/MIN/1.73M*2
ERYTHROCYTE [DISTWIDTH] IN BLOOD BY AUTOMATED COUNT: 16.9 % (ref 11.5–14.5)
GLUCOSE BLD MANUAL STRIP-MCNC: 106 MG/DL (ref 74–99)
GLUCOSE BLD MANUAL STRIP-MCNC: 113 MG/DL (ref 74–99)
GLUCOSE BLD MANUAL STRIP-MCNC: 141 MG/DL (ref 74–99)
GLUCOSE SERPL-MCNC: 105 MG/DL (ref 74–99)
HCT VFR BLD AUTO: 47.6 % (ref 41–52)
HGB BLD-MCNC: 13.8 G/DL (ref 13.5–17.5)
LEFT VENTRICULAR OUTFLOW TRACT DIAMETER: 1.9 CM
LEGIONELLA AG UR QL: NEGATIVE
MAGNESIUM SERPL-MCNC: 2.06 MG/DL (ref 1.6–2.4)
MCH RBC QN AUTO: 22.9 PG (ref 26–34)
MCHC RBC AUTO-ENTMCNC: 29 G/DL (ref 32–36)
MCV RBC AUTO: 79 FL (ref 80–100)
NRBC BLD-RTO: 0 /100 WBCS (ref 0–0)
PLATELET # BLD AUTO: 237 X10*3/UL (ref 150–450)
POTASSIUM SERPL-SCNC: 4.6 MMOL/L (ref 3.5–5.3)
PROT SERPL-MCNC: 7.3 G/DL (ref 6.4–8.2)
RBC # BLD AUTO: 6.03 X10*6/UL (ref 4.5–5.9)
S PNEUM AG UR QL: NEGATIVE
SODIUM SERPL-SCNC: 136 MMOL/L (ref 136–145)
STAPHYLOCOCCUS SPEC CULT: ABNORMAL
WBC # BLD AUTO: 9.8 X10*3/UL (ref 4.4–11.3)

## 2024-01-22 PROCEDURE — 2500000002 HC RX 250 W HCPCS SELF ADMINISTERED DRUGS (ALT 637 FOR MEDICARE OP, ALT 636 FOR OP/ED): Performed by: STUDENT IN AN ORGANIZED HEALTH CARE EDUCATION/TRAINING PROGRAM

## 2024-01-22 PROCEDURE — 94640 AIRWAY INHALATION TREATMENT: CPT

## 2024-01-22 PROCEDURE — 93306 TTE W/DOPPLER COMPLETE: CPT | Performed by: INTERNAL MEDICINE

## 2024-01-22 PROCEDURE — 84075 ASSAY ALKALINE PHOSPHATASE: CPT | Performed by: STUDENT IN AN ORGANIZED HEALTH CARE EDUCATION/TRAINING PROGRAM

## 2024-01-22 PROCEDURE — 36415 COLL VENOUS BLD VENIPUNCTURE: CPT | Performed by: STUDENT IN AN ORGANIZED HEALTH CARE EDUCATION/TRAINING PROGRAM

## 2024-01-22 PROCEDURE — 2500000004 HC RX 250 GENERAL PHARMACY W/ HCPCS (ALT 636 FOR OP/ED)

## 2024-01-22 PROCEDURE — 1200000002 HC GENERAL ROOM WITH TELEMETRY DAILY

## 2024-01-22 PROCEDURE — 83735 ASSAY OF MAGNESIUM: CPT | Performed by: STUDENT IN AN ORGANIZED HEALTH CARE EDUCATION/TRAINING PROGRAM

## 2024-01-22 PROCEDURE — 94660 CPAP INITIATION&MGMT: CPT

## 2024-01-22 PROCEDURE — 99233 SBSQ HOSP IP/OBS HIGH 50: CPT | Performed by: INTERNAL MEDICINE

## 2024-01-22 PROCEDURE — 99233 SBSQ HOSP IP/OBS HIGH 50: CPT

## 2024-01-22 PROCEDURE — 82947 ASSAY GLUCOSE BLOOD QUANT: CPT

## 2024-01-22 PROCEDURE — 2500000004 HC RX 250 GENERAL PHARMACY W/ HCPCS (ALT 636 FOR OP/ED): Performed by: STUDENT IN AN ORGANIZED HEALTH CARE EDUCATION/TRAINING PROGRAM

## 2024-01-22 PROCEDURE — 85027 COMPLETE CBC AUTOMATED: CPT | Performed by: STUDENT IN AN ORGANIZED HEALTH CARE EDUCATION/TRAINING PROGRAM

## 2024-01-22 PROCEDURE — 99291 CRITICAL CARE FIRST HOUR: CPT

## 2024-01-22 PROCEDURE — 2500000001 HC RX 250 WO HCPCS SELF ADMINISTERED DRUGS (ALT 637 FOR MEDICARE OP): Performed by: STUDENT IN AN ORGANIZED HEALTH CARE EDUCATION/TRAINING PROGRAM

## 2024-01-22 PROCEDURE — 93306 TTE W/DOPPLER COMPLETE: CPT

## 2024-01-22 RX ADMIN — HEPARIN SODIUM 7500 UNITS: 5000 INJECTION INTRAVENOUS; SUBCUTANEOUS at 05:31

## 2024-01-22 RX ADMIN — INSULIN GLARGINE 10 UNITS: 100 INJECTION, SOLUTION SUBCUTANEOUS at 20:22

## 2024-01-22 RX ADMIN — PERFLUTREN 2 ML OF DILUTION: 6.52 INJECTION, SUSPENSION INTRAVENOUS at 13:57

## 2024-01-22 RX ADMIN — HEPARIN SODIUM 7500 UNITS: 5000 INJECTION INTRAVENOUS; SUBCUTANEOUS at 22:17

## 2024-01-22 RX ADMIN — HEPARIN SODIUM 7500 UNITS: 5000 INJECTION INTRAVENOUS; SUBCUTANEOUS at 14:00

## 2024-01-22 RX ADMIN — FLUTICASONE FUROATE AND VILANTEROL TRIFENATATE 1 PUFF: 100; 25 POWDER RESPIRATORY (INHALATION) at 07:36

## 2024-01-22 RX ADMIN — FENOFIBRATE 160 MG: 160 TABLET, FILM COATED ORAL at 12:58

## 2024-01-22 RX ADMIN — CEFTRIAXONE SODIUM 1 G: 1 INJECTION, SOLUTION INTRAVENOUS at 22:54

## 2024-01-22 RX ADMIN — ATORVASTATIN CALCIUM 40 MG: 40 TABLET, FILM COATED ORAL at 20:22

## 2024-01-22 SDOH — SOCIAL STABILITY: SOCIAL INSECURITY: WERE YOU ABLE TO COMPLETE ALL THE BEHAVIORAL HEALTH SCREENINGS?: YES

## 2024-01-22 SDOH — SOCIAL STABILITY: SOCIAL INSECURITY: HAS ANYONE EVER THREATENED TO HURT YOUR FAMILY OR YOUR PETS?: NO

## 2024-01-22 SDOH — SOCIAL STABILITY: SOCIAL INSECURITY: DO YOU FEEL ANYONE HAS EXPLOITED OR TAKEN ADVANTAGE OF YOU FINANCIALLY OR OF YOUR PERSONAL PROPERTY?: NO

## 2024-01-22 SDOH — SOCIAL STABILITY: SOCIAL INSECURITY: DOES ANYONE TRY TO KEEP YOU FROM HAVING/CONTACTING OTHER FRIENDS OR DOING THINGS OUTSIDE YOUR HOME?: NO

## 2024-01-22 SDOH — SOCIAL STABILITY: SOCIAL INSECURITY: HAVE YOU HAD THOUGHTS OF HARMING ANYONE ELSE?: NO

## 2024-01-22 SDOH — SOCIAL STABILITY: SOCIAL INSECURITY: DO YOU FEEL UNSAFE GOING BACK TO THE PLACE WHERE YOU ARE LIVING?: NO

## 2024-01-22 SDOH — SOCIAL STABILITY: SOCIAL INSECURITY: ARE THERE ANY APPARENT SIGNS OF INJURIES/BEHAVIORS THAT COULD BE RELATED TO ABUSE/NEGLECT?: NO

## 2024-01-22 SDOH — SOCIAL STABILITY: SOCIAL INSECURITY: ABUSE: ADULT

## 2024-01-22 SDOH — SOCIAL STABILITY: SOCIAL INSECURITY: ARE YOU OR HAVE YOU BEEN THREATENED OR ABUSED PHYSICALLY, EMOTIONALLY, OR SEXUALLY BY ANYONE?: NO

## 2024-01-22 ASSESSMENT — PAIN SCALES - GENERAL
PAINLEVEL_OUTOF10: 0 - NO PAIN

## 2024-01-22 ASSESSMENT — LIFESTYLE VARIABLES
HOW OFTEN DO YOU HAVE 6 OR MORE DRINKS ON ONE OCCASION: LESS THAN MONTHLY
HOW MANY STANDARD DRINKS CONTAINING ALCOHOL DO YOU HAVE ON A TYPICAL DAY: 3 OR 4
AUDIT-C TOTAL SCORE: 4
AUDIT-C TOTAL SCORE: 4
HOW OFTEN DO YOU HAVE A DRINK CONTAINING ALCOHOL: 2-4 TIMES A MONTH
SKIP TO QUESTIONS 9-10: 0

## 2024-01-22 ASSESSMENT — PAIN - FUNCTIONAL ASSESSMENT
PAIN_FUNCTIONAL_ASSESSMENT: 0-10
PAIN_FUNCTIONAL_ASSESSMENT: 0-10

## 2024-01-22 NOTE — PROGRESS NOTES
Subjective   Seen on Airvo 40/40 this AM. Denies any SOB, chest pain, or abdominal pain.  Patient was transitioned from Airvo 40/40 to 6LNC. Advanced to carb controlled diet. Will adjust insulin needs accordingly. TTE completed this AM.    Objective   Physical Exam:   Constitutional: awake, alert, morbidly obese  ENMT: mucous membranes moist, conjunctivae clear  Head/Neck: normocephalic, atraumatic; supple, trachea midline  Respiratory/Thorax: diminished breath sounds but otherwise clear  Cardiovascular: RRR, no murmur appreciated  Gastrointestinal: soft, nondistended, non-tender, bowel sounds appreciated  Extremities: palpable peripheral pulses, no edema  Neurological: AO x3, no focal deficits  Psychological: pleasant, cooperative  Skin: scattered raised, dry, and nonerythematous lesions throughout BUE, BLE, and abdomen    Scheduled Medications:   atorvastatin, 40 mg, oral, Nightly  cefTRIAXone, 1 g, intravenous, q24h  fenofibrate, 160 mg, oral, Daily  fluticasone furoate-vilanteroL, 1 puff, inhalation, Daily  heparin (porcine), 7,500 Units, subcutaneous, q8h JOSE  insulin glargine, 10 Units, subcutaneous, Nightly  insulin lispro, 0-10 Units, subcutaneous, Before meals & nightly    Continuous Medications: None      PRN Medications:   PRN medications: acetaminophen **OR** acetaminophen **OR** acetaminophen, acetylcysteine, benzonatate, dextrose 10 % in water (D10W), dextrose, glucagon, hydrocortisone, ipratropium-albuteroL, melatonin, oxygen    Assessment/Plan   This is a 28-year-old male, with history of morbid obesity, IDDM2 on U500 C/B retinopathy and nephropathy, NAFLD, psoriasis, ERIK noncompliant on CPAP, and hyperlipidemia, who initially presented to [facility] on 1/19/2024 with shortness of breath and cough since [place] time.  He was found to be in acute hypoxic and hypercapnic respiratory failure in the setting of RSV pneumonia and suspected ADHF.  He has known ERIK for which he has been noncompliant on CPAP.   There is concern for underlying OHS as well.  He was empirically started on antibiotics for RSV pneumonia.  Initial plan was for him to be admitted to stepdown unit; however, due to worsening hypercapnia despite multiple BiPAP adjustments, he was transferred to the ICU for further management.     Neurological:  #Acute metabolic encephalopathy  - Avoid sedating medications as patient is at high risk of intubation 2/2 hypercapnia.     Cardiovascular:  #Acute decompensated heart failure, suspected  #Hyperlipidemia  Suspect right heart failure secondary to untreated ERIK/OHS.  - S/p Lasix 80mg total this admission. Hold off on further diuresis given LILLIAM. Creatinine improving.  - Cardiology following. TTE demonstrating EF 50%, structures not well visualized.   - Not requiring pressors. Maintain MAP>65.  - Continue home Fenofibrate, Atorvastatin. Hold home Lisinopril given LILLIAM.     Respiratory:  #Acute hypoxic and hypercapnic respiratory failure  #RSV pneumonia  #ERIK, noncompliant on CPAP  #Concern for underlying OHS  #Right pleural effusion, small  Transitioned off Airvo 40/40 and onto 6LNC.  - Antibiotics as under ID. Procal 0.27. Consider discontinuing on 1/22. UAg, SCx pending.  - Continue AVAPS QHS, wean oxygen as tolerated.      Gastrointestinal:  #NAFLD in setting of morbid obesity  - Diet: Carb controlled  - PPX: -     Endocrine:  #IDDM2 on U500 c/b mild nonproliferative retinopathy and nephropathy  Home regimen: U500 36 units TID AC, Trulicity 3mg qwk, Metformin 1000mg BID  Inpatient regimen: Lantus 10 QHS, SSI (0-10) TID AC and QHS  - Endocrinology following  - Will adjust insulin needs accordingly as diet is advanced     Renal:  #LILLIAM, suspect prerenal  #Chronic metabolic alkalosis in setting of untreated ERIK/OHS  Baseline SCr ~0.7-0.8  - S/p Lasix 80mg total this admission. Hold off on further diuresis.  - Hold home Lisinopril given LILLIAM. Avoid nephrotoxic meds.  - Check urine lytes for FEurea      Hematological:  No acute issues.     Infection Disease:  #RSV pneumonia  Procal 0.27.  -Continue Rocephin for 5 days total, discontinue azithromycin in the setting of negative atypical urine antigens.  Follow blood cultures.     Skin/MSK:  #Psoriasis  Has not been on any biologics. Scheduled to see rheum later this month. Scheduled to see dermatology this Friday.   - PRN topical hydrocortisone     ICU CHECKLIST:  Antimicrobials: Rocephin (1/19 - )  Oxygen: Airvo 40/40, AVAPS QHS  Feeding: Clear liquids, ADAT  Fluids: -  Analgesia: -  Sedation: -  Thromboprophylaxis: SCDs and SQH  Ulcer prophylaxis: -  Glycemic control: BGM with Lantus 10 QHS, SSI  Bowel care: PRN   Indwelling catheters: -  Lines: PIVs  Dispo: MICU - Anticipate stable for transfer to telemetry within next 24hr.  Code Status: FULL    ICU team signing off; patient is stable for transfer to regular nursing floor tomorrow morning.     This is a preliminary note written by the resident. Please wait for attending addendum for finalization of note and recommendations.    Lloyd Berry DO   Internal Medicine PGY1

## 2024-01-22 NOTE — CARE PLAN
Problem: Pain - Adult  Goal: Verbalizes/displays adequate comfort level or baseline comfort level  1/22/2024 0508 by Yenny Atwood RN  Outcome: Progressing  1/22/2024 0454 by Yenny Atwood RN  Outcome: Progressing     Problem: Safety - Adult  Goal: Free from fall injury  1/22/2024 0508 by Yenny Atwood RN  Outcome: Progressing  1/22/2024 0454 by Yenny Atwood RN  Outcome: Progressing     Problem: Diabetes  Goal: No changes in neurological exam by end of shift  1/22/2024 0508 by Yenny Atwood RN  Outcome: Progressing  1/22/2024 0454 by Yenny Atwood RN  Outcome: Progressing  Goal: Increase self care and/or family involovement by end of shift  1/22/2024 0508 by Yenny Atwood RN  Outcome: Progressing  1/22/2024 0454 by Yenny Atwood RN  Outcome: Progressing     Problem: Skin  Goal: Prevent/minimize sheer/friction injuries  Outcome: Progressing  Goal: Promote/optimize nutrition  Outcome: Progressing  Goal: Promote skin healing  Outcome: Progressing    The clinical goals for the shift include maintain SpO2 greater than 92% overnight.  Required FiO2 titration up to 60% overnight to keep SpO2 greater than 92%.  Barriers to progression include RSV and pneumonia. Recommendations to address these barriers include continue ordered interventions and treatments.

## 2024-01-22 NOTE — PROGRESS NOTES
Subjective   Tawanda Lomeli is a 28 y.o. male who presents with Shortness of Breath.    Patient seen this morning on airvo 40/40. He states that his SOB is well controlled on airvo. Discussed with ICU team about possible transfer to floor.    Objective   Vitals:    01/22/24 1103   BP:    Pulse:    Resp:    Temp:    SpO2: 96%      Physical Exam  Physical Exam:  Constitutional: obese, alert, cooperative, A&Ox3  ENMT: mucous membranes moist, EOMI, conjunctivae clear  Head/Neck: normocephalic, atraumatic; supple, trachea midline  Respiratory/Thorax: patent airways, CTAB; no wheezes, rales, or rhonchi  Cardiovascular: RRR, no murmur  Gastrointestinal: soft, nondistended, non-tender, bowel sounds appreciated  Extremities: palpable peripheral pulses, no edema or cyanosis  Neurological: AO x3, no focal deficits  Psychological: appropriate mood and behavior  Skin: diffuse scaly patches/plaque over trunk, extremities and posterior neck    Assessment/Plan       Tawanda Lomeli is a 28-year-old male with PMH type 2 diabetes, ERIK, obesity, poorly controlled psoriasis who presented to [facility] with shortness of breath and is being treated for acute hypoxic respiratory secondary to CHF versus pneumonia and/or RSV positive.    Acute medical conditions:  #Acute hypoxic hypercapnic respiratory failure  #OHS/ERIK noncompliant on CPAP  #RSV pneumonia  VBG on admission showed pH 7.27, pCO2 77, bicarb 35.4  Patient diagnosed with ERIK at age 13, noncompliant with CPAP since age 18  CTA was ordered which showed no signs of PE; CXR showed bilateral patchy infiltrate  -Azithromycin and Rocephin; sputum cultures ordered, urine antigens pending  -Procal 0.27, lactate 0.7  -Repeat ABG 1/21/24  showed mild improvement; pH 7.32, PCO2 75, HCO3 38.6  -Currently on airvo 40/40, stable, plan for transfer out of ICU    #Acute decompensated heart failure suspected, new onset  Given total 80 mg IV lasix on admission 1/20  BNP elevated at 251  -Cardiology  consulted, hold lasix at this time, will continue to follow  -Echo pending    #T2DM  #Morbid obesity  #NAFLD 2/2 morbid obesity   Last HAC1 6.3 on 10/24/23  Home medications include: Trulicity, metformin - held  -Endocrinology consulted, appreciate recommendations  -Ordered SSI #2 and Lanus 10U nightly    Chronic medical conditions:  #Psoriasis  Patient has appointment with dermatologist for this Friday regarding his psoriasis   -Hydrocortisone as needed, wound care consulted    DVT PPX: SCDs, heparin  Diet: CLD  IVF: None  Code Status: FULL    This is a preliminary note written by the resident. Please wait for attending addendum for finalization of note and recommendations.    Feliciano Page DO, PhD  Internal Medicine PGY1

## 2024-01-22 NOTE — PROGRESS NOTES
"    Endocrinology Inpatient Consult Progress Note     PATIENT NAME: Tawanda Lomeli  MRN: [id number]  DATE: 1/22/2024    CONSULTING PHYSICIAN: Dr. KHLOE Falcon   REASON FOR CONSULT: Uncontrolled T2DM.        Interval Events     No overnight events.  Off of noninvasive positive pressure ventilation this morning.  The patient has been given clear liquid diet.  Patient on airvo 40/40 this morning.   He denies any acute complaints currently.       Physical Examination     /63   Pulse 93   Temp 36.7 °C (98.1 °F) (Temporal)   Resp 24   Ht 1.702 m (5' 7\")   Wt (!) 155 kg (342 lb 6 oz)   SpO2 96%   BMI 53.62 kg/m²   No acute distress.  Airvo in place.  Regular rate and rhythm.  Nonlabored respiration.  Soft nontender nondistended abdomen.  No pedal edema bilaterally.  No rashes.      Medications     Reviewed MAR       Data     Recent Labs and Imaging Reviewed      Assessment / Plan        # Uncontrolled Type 2 Diabetes Mellitus  Home Regimen: Metformin 1 g BID.  Dulaglutide 3 mg qWK. U500 36-36-36.  Hemoglobin A1c (10/24): 6.3%  Nutrition: CHO controlled     Sugars have been goal.  Diet has been advanced, although has not had much food.     -Cont glargine 10 units nightly  -Cont Lispro SS  -Will start prandial insulin once diet improves as I suspect he will have some hyperglycemia once he is able to eat.     # Acute Hypercarbic Resp Fx: Likely secondary to RSV infection.  The patient is on noninvasive positive pressure ventilation.  Not on steroids.     # Hypertension: On lisinopril 20 mg daily at home.  This has been held as an inpatient.     # Dyslipidemia: Reviewed his lipid panel on admission.  His LDL cholesterol as well as triglycerides are at goal.  The patient is maintained on atorvastatin 40 mg as well as fenofibrate 160 mg.  I reviewed his endocrinology note.     # NAFLD: Agree with use of a GLP-1 receptor analog.  Consider addition of pioglitazone.  The patient does have many signs and symptoms of insulin " resistance on exam.  Pioglitazone has also been shown to decrease histological findings of NAFLD in patients with T2DM.     # Obesity: Consider 1 mg overnight dexamethasone suppression test to rule out Cushing's as an outpatient.       Archie Vivar DO   Internal Medicine PGY-3     _________________________  STAFF TEACHING ADDENDUM  I have reviewed the progress note obtained and documented by Dr. Vivar and I personally participated in the key components. I have discussed the case and management of the patient's care with Dr. Vivar. I have edited the note as needed.    Jose R Falcon, DO  Endocrinology, Diabetes, and Metabolism  1/22/2024 8:43 AM    Please excuse and typographical or unwanted errors within this documentation as voice recognition software was used to dictate this note.   __________________________

## 2024-01-22 NOTE — CONSULTS
Nutrition Note  Reason for Assessment  Reason for Assessment: Provider consult order    Visit Reason: SOB  Recommendation(s):  Continue 75 gm carb consistent diet, monitor for possible supplement need         Nutrition Assessment    Nutrition History  Food and Nutrient History: Talked to patient and his mother.  He admits to not following his diet at home.  He said he did count carbs at 1 time but after telling me he did 30 carbs per meal,  he said that he did not really remember.  I provided info for him and discussed his diet with he and his mother.  A contact number was provided and questions were encouraged.      Per Flowsheet Percent Meal intake:    Dietary Orders (From admission, onward)       Start     Ordered    01/21/24 1935  Adult diet Carb Controlled; 75 gram carb/meal, 45 gram Carb evening snack  Diet effective now        Question Answer Comment   Diet type Carb Controlled    Carb diet selection: 75 gram carb/meal, 45 gram Carb evening snack        01/21/24 1935                     Results from last 7 days   Lab Units 01/22/24  0513 01/21/24  0534 01/20/24  0451   GLUCOSE mg/dL 105* 129* 135*   SODIUM mmol/L 136 136 134*   POTASSIUM mmol/L 4.6 4.8 4.6   CHLORIDE mmol/L 96* 95* 95*   CO2 mmol/L 38* 34* 36*   BUN mg/dL 20 27* 15   CREATININE mg/dL 1.15 1.26 1.16   EGFR mL/min/1.73m*2 89 80 88   CALCIUM mg/dL 8.2* 8.5* 8.7   MAGNESIUM mg/dL 2.06 1.91 1.67     Lab Results   Component Value Date    HGBA1C 8.0 (H) 12/13/2023    HGBA1C 6.3 (A) 10/14/2022    HGBA1C 6.6 (A) 06/24/2022     Results from last 7 days   Lab Units 01/22/24  1314 01/21/24  2038 01/21/24  1457 01/21/24  1154 01/21/24  0447 01/20/24  2342 01/20/24  2102 01/20/24  1321   POCT GLUCOSE mg/dL 113* 159* 133* 109* 133* 122* 131* 129*     GI per flowsheet:  Gastrointestinal  Gastrointestinal (WDL): Within Defined Limits  Abdomen Inspection: Rounded  Abdominal Tenderness: Nontender  Bowel Sounds: All quadrants  Bowel Sounds (All Quadrants):  "Active  Passing Flatus: Yes  Last BM Date: 01/22/24  Bowel Incontinence: No  Stool Appearance: Formed  Gastrointestinal Symptoms: None  Last bowel movement documented: 01/22/24  PMH: DM; severe rash; apnea at 13; acute resp failure; pneumonia; obesity  Allergies: Aspirin     Anthropometrics:  Height: 170.2 cm (5' 7.01\")  Weight: (!) 155 kg (342 lb 6 oz)  BMI (Calculated): 53.61  IBW: 67 kg  %IBW: 232 %           Estimated Nutritional Needs:  Method for Estimating Needs: 6720-2325    26-30 morro kg of IBW + 10%   (pt is not active)    Method for Estimating Needs: 75-89   1-1.2 gm kg of IBW + 10%    Method for Estimating Needs: 9326-0914  20-30 ml kg of IBW + 10%    Nutrition Focused Physical Findings:         Pain Score: 0 - No pain     Nutrition Diagnosis   Patient has Malnutrition Diagnosis: No    Patient has Nutrition Diagnosis: Yes  Ongoing  Nutrition Diagnosis 1: Obese  Related to (1): excessive energy intake and physical inactivity  As Evidenced by (1): obesity grade III    BMI  53.6             Nutrition Interventions/Recommendations   Interventions        Individualized Nutrition Prescription Provided for : Continue 75 gm carb consistent diet,  monitor for possible supplement need    Nutrition Monitoring and Evaluation   Biochemical Data, Medical Tests and Procedures  Monitoring and Evaluation Plan: Electrolyte/renal panel, Glucose/endocrine profile  Food/Nutrient Related History Monitoring  Monitoring and Evaluation Plan: Energy intake, Fluid intake, Amount of food    Progress towards goals: Met    Education Documentation  No documentation found.           Time Spent (min): 60 minutes  Last Date of Nutrition Visit: 01/22/24  Nutrition Follow-Up Needed?: 3-5 days  Follow up Comment: 1/26  SALMA   "

## 2024-01-22 NOTE — PROGRESS NOTES
1/22/2024  Pt in ICU on high flow O2.  Attempted to meet with pt- however- pt currently getting echo done.  CT Team will continue to follow.   Catalina Andrews RN TCc

## 2024-01-22 NOTE — PROGRESS NOTES
Subjective Data:  Denies any chest discomfort.  States his breathing is improved.      Overnight Events:    Off BiPAP.     Objective Data:  Last Recorded Vitals:  Vitals:    01/22/24 0900 01/22/24 1000 01/22/24 1100 01/22/24 1103   BP: 132/63 137/69 117/61    Pulse: 105 100 100    Resp: (!) 32 18 (!) 27    Temp:       TempSrc:       SpO2: 92% 96% 95% 96%   Weight:       Height:           Last Labs:  CBC - 1/22/2024:  5:13 AM  9.8 13.8 237    47.6      CMP - 1/22/2024:  5:13 AM  8.2 7.3 22 --- 1.1   4.2 3.8 9 36      PTT - 1/19/2024:  9:17 PM  1.7   19.5 37     TROPHS   Date/Time Value Ref Range Status   01/19/2024 10:21 PM 27 0 - 20 ng/L Final   01/19/2024 09:17 PM 23 0 - 20 ng/L Final     BNP   Date/Time Value Ref Range Status   01/19/2024 09:17  0 - 99 pg/mL Final     HGBA1C   Date/Time Value Ref Range Status   12/13/2023 09:13 AM 8.0 see below % Final   10/14/2022 10:34 AM 6.3 % Final     Comment:          Diagnosis of Diabetes-Adults   Non-Diabetic: < or = 5.6%   Increased risk for developing diabetes: 5.7-6.4%   Diagnostic of diabetes: > or = 6.5%  .       Monitoring of Diabetes                Age (y)     Therapeutic Goal (%)   Adults:          >18           <7.0   Pediatrics:    13-18           <7.5                   7-12           <8.0                   0- 6            7.5-8.5   American Diabetes Association. Diabetes Care 33(S1), Jan 2010.     06/24/2022 10:00 AM 6.6 % Final     Comment:          Diagnosis of Diabetes-Adults   Non-Diabetic: < or = 5.6%   Increased risk for developing diabetes: 5.7-6.4%   Diagnostic of diabetes: > or = 6.5%  .       Monitoring of Diabetes                Age (y)     Therapeutic Goal (%)   Adults:          >18           <7.0   Pediatrics:    13-18           <7.5                   7-12           <8.0                   0- 6            7.5-8.5   American Diabetes Association. Diabetes Care 33(S1), Jan 2010.       LDLCALC   Date/Time Value Ref Range Status   01/19/2024 10:21  "PM 32 <=99 mg/dL Final     Comment:                                 Near   Borderline      AGE      Desirable  Optimal    High     High     Very High     0-19 Y     0 - 109     ---    110-129   >/= 130     ----    20-24 Y     0 - 119     ---    120-159   >/= 160     ----      >24 Y     0 -  99   100-129  130-159   160-189     >/=190     12/13/2023 09:13 AM 94 <=99 mg/dL Final     Comment:                                 Near   Borderline      AGE      Desirable  Optimal    High     High     Very High     0-19 Y     0 - 109     ---    110-129   >/= 130     ----    20-24 Y     0 - 119     ---    120-159   >/= 160     ----      >24 Y     0 -  99   100-129  130-159   160-189     >/=190       VLDL   Date/Time Value Ref Range Status   01/19/2024 10:21 PM 14 0 - 40 mg/dL Final   12/13/2023 09:13 AM 21 0 - 40 mg/dL Final   06/24/2022 10:00 AM 29 0 - 40 mg/dL Final   07/16/2021 09:40 AM 66 0 - 40 mg/dL Final   03/15/2021 10:16 AM SEE COMMENT 0 - 40 mg/dL Final     Comment:       Unable to calculate VLDL.      Last I/O:  I/O last 3 completed shifts:  In: 1200 (7.7 mL/kg) [P.O.:600; IV Piggyback:600]  Out: 2500 (16.1 mL/kg) [Urine:2500 (0.4 mL/kg/hr)]  Weight: 155.3 kg     Past Cardiology Tests (Last 3 Years):  EKG:  No results found for this or any previous visit from the past 1095 days.    Echo:  Transthoracic Echo (TTE) Complete 01/22/2024  CONCLUSIONS:   1. Left ventricular systolic function is low normal with a 50% estimated ejection fraction.   2. Poorly visualized anatomical structures due to suboptimal image quality.   3. Unable to assess RV function given poor quality study.    Ejection Fractions:  No results found for: \"EF\"  Cath:  No results found for this or any previous visit from the past 1095 days.    Stress Test:  No results found for this or any previous visit from the past 1095 days.    Cardiac Imaging:  No results found for this or any previous visit from the past 1095 days.      Inpatient " Medications:  Scheduled medications   Medication Dose Route Frequency    atorvastatin  40 mg oral Nightly    cefTRIAXone  1 g intravenous q24h    fenofibrate  160 mg oral Daily    fluticasone furoate-vilanteroL  1 puff inhalation Daily    heparin (porcine)  7,500 Units subcutaneous q8h JOSE    insulin glargine  10 Units subcutaneous Nightly    insulin lispro  0-10 Units subcutaneous Before meals & nightly     PRN medications   Medication    acetaminophen    Or    acetaminophen    Or    acetaminophen    acetylcysteine    benzonatate    dextrose 10 % in water (D10W)    dextrose    glucagon    hydrocortisone    ipratropium-albuteroL    melatonin    oxygen     Continuous Medications   Medication Dose Last Rate       Physical Exam:  Physical Exam  Vitals reviewed.   Constitutional:       Appearance: Normal appearance.   HENT:      Head: Normocephalic and atraumatic.      Mouth/Throat:      Mouth: Mucous membranes are moist.      Pharynx: Oropharynx is clear.   Eyes:      Extraocular Movements: Extraocular movements intact.      Conjunctiva/sclera: Conjunctivae normal.   Cardiovascular:      Rate and Rhythm: Normal rate and regular rhythm.      Pulses: Normal pulses.      Heart sounds: Normal heart sounds.   Pulmonary:      Effort: Pulmonary effort is normal.      Breath sounds: Examination of the right-lower field reveals rales. Examination of the left-lower field reveals rales. Rales present.   Abdominal:      General: Bowel sounds are normal.      Palpations: Abdomen is soft.   Musculoskeletal:      Cervical back: Neck supple.   Skin:     General: Skin is warm and dry.   Neurological:      General: No focal deficit present.      Mental Status: He is alert.   Psychiatric:         Mood and Affect: Mood normal.         Behavior: Behavior normal.           Assessment/Plan   1/21/2024  1.  Suspected right-sided heart failure: He was given a dose of Lasix in the emergency department.  Difficult to assess volume status given  body habitus.  Will hold off on additional Lasix at this time.  Echocardiogram is pending.  Further recommendations to follow after echocardiogram is performed.  2.  Respiratory failure  3.  RSV infection  4.  ERIK  5.  Morbid obesity  6.  Diabetes    1/22/2024  1.  Suspected right-sided heart failure: Upon reviewing images, right ventricle does appear enlarged with slight hypokinesis, although difficult to tell.  Will hold off on additional diuresis today.  Would reassess daily for need for additional diuresis.  2.  Respiratory failure  3.  RSV infection  4.  ERIK  5.  Morbid obesity  6.  Diabetes    Peripheral IV 01/19/24 20 G Distal;Right;Upper Arm (Active)   Site Assessment Clean;Dry;Intact 01/22/24 0400   Dressing Type Transparent 01/22/24 0400   Line Status Saline locked 01/22/24 0400   Dressing Status Clean;Dry;Occlusive 01/22/24 0400   Number of days: 3       Code Status:  Full Code    Alfredo Fam MD

## 2024-01-23 LAB
ALBUMIN SERPL BCP-MCNC: 3.9 G/DL (ref 3.4–5)
ALP SERPL-CCNC: 40 U/L (ref 33–120)
ALT SERPL W P-5'-P-CCNC: 10 U/L (ref 10–52)
ANION GAP SERPL CALC-SCNC: 12 MMOL/L (ref 10–20)
AST SERPL W P-5'-P-CCNC: 22 U/L (ref 9–39)
BILIRUB SERPL-MCNC: 1.1 MG/DL (ref 0–1.2)
BUN SERPL-MCNC: 15 MG/DL (ref 6–23)
CALCIUM SERPL-MCNC: 8.7 MG/DL (ref 8.6–10.3)
CHLORIDE SERPL-SCNC: 93 MMOL/L (ref 98–107)
CO2 SERPL-SCNC: 36 MMOL/L (ref 21–32)
CREAT SERPL-MCNC: 0.84 MG/DL (ref 0.5–1.3)
EGFRCR SERPLBLD CKD-EPI 2021: >90 ML/MIN/1.73M*2
ERYTHROCYTE [DISTWIDTH] IN BLOOD BY AUTOMATED COUNT: 17.1 % (ref 11.5–14.5)
GLUCOSE BLD MANUAL STRIP-MCNC: 127 MG/DL (ref 74–99)
GLUCOSE BLD MANUAL STRIP-MCNC: 235 MG/DL (ref 74–99)
GLUCOSE BLD MANUAL STRIP-MCNC: 93 MG/DL (ref 74–99)
GLUCOSE SERPL-MCNC: 106 MG/DL (ref 74–99)
HCT VFR BLD AUTO: 50.8 % (ref 41–52)
HGB BLD-MCNC: 14.6 G/DL (ref 13.5–17.5)
MAGNESIUM SERPL-MCNC: 2.08 MG/DL (ref 1.6–2.4)
MCH RBC QN AUTO: 22.6 PG (ref 26–34)
MCHC RBC AUTO-ENTMCNC: 28.7 G/DL (ref 32–36)
MCV RBC AUTO: 79 FL (ref 80–100)
NRBC BLD-RTO: 0 /100 WBCS (ref 0–0)
PLATELET # BLD AUTO: 240 X10*3/UL (ref 150–450)
POTASSIUM SERPL-SCNC: 4.5 MMOL/L (ref 3.5–5.3)
PROT SERPL-MCNC: 7.4 G/DL (ref 6.4–8.2)
RBC # BLD AUTO: 6.46 X10*6/UL (ref 4.5–5.9)
SODIUM SERPL-SCNC: 136 MMOL/L (ref 136–145)
WBC # BLD AUTO: 7.5 X10*3/UL (ref 4.4–11.3)

## 2024-01-23 PROCEDURE — 85027 COMPLETE CBC AUTOMATED: CPT

## 2024-01-23 PROCEDURE — 83735 ASSAY OF MAGNESIUM: CPT

## 2024-01-23 PROCEDURE — 80053 COMPREHEN METABOLIC PANEL: CPT

## 2024-01-23 PROCEDURE — 2500000004 HC RX 250 GENERAL PHARMACY W/ HCPCS (ALT 636 FOR OP/ED): Performed by: STUDENT IN AN ORGANIZED HEALTH CARE EDUCATION/TRAINING PROGRAM

## 2024-01-23 PROCEDURE — 2500000002 HC RX 250 W HCPCS SELF ADMINISTERED DRUGS (ALT 637 FOR MEDICARE OP, ALT 636 FOR OP/ED): Performed by: STUDENT IN AN ORGANIZED HEALTH CARE EDUCATION/TRAINING PROGRAM

## 2024-01-23 PROCEDURE — 1200000002 HC GENERAL ROOM WITH TELEMETRY DAILY

## 2024-01-23 PROCEDURE — 82947 ASSAY GLUCOSE BLOOD QUANT: CPT

## 2024-01-23 PROCEDURE — 2500000001 HC RX 250 WO HCPCS SELF ADMINISTERED DRUGS (ALT 637 FOR MEDICARE OP): Performed by: STUDENT IN AN ORGANIZED HEALTH CARE EDUCATION/TRAINING PROGRAM

## 2024-01-23 PROCEDURE — 94640 AIRWAY INHALATION TREATMENT: CPT

## 2024-01-23 PROCEDURE — 94660 CPAP INITIATION&MGMT: CPT

## 2024-01-23 PROCEDURE — 99233 SBSQ HOSP IP/OBS HIGH 50: CPT

## 2024-01-23 PROCEDURE — 36415 COLL VENOUS BLD VENIPUNCTURE: CPT

## 2024-01-23 RX ADMIN — HEPARIN SODIUM 7500 UNITS: 5000 INJECTION INTRAVENOUS; SUBCUTANEOUS at 14:00

## 2024-01-23 RX ADMIN — FENOFIBRATE 160 MG: 160 TABLET, FILM COATED ORAL at 09:39

## 2024-01-23 RX ADMIN — INSULIN GLARGINE 10 UNITS: 100 INJECTION, SOLUTION SUBCUTANEOUS at 20:20

## 2024-01-23 RX ADMIN — HEPARIN SODIUM 7500 UNITS: 5000 INJECTION INTRAVENOUS; SUBCUTANEOUS at 20:16

## 2024-01-23 RX ADMIN — INSULIN LISPRO 4 UNITS: 100 INJECTION, SOLUTION INTRAVENOUS; SUBCUTANEOUS at 20:27

## 2024-01-23 RX ADMIN — FLUTICASONE FUROATE AND VILANTEROL TRIFENATATE 1 PUFF: 100; 25 POWDER RESPIRATORY (INHALATION) at 08:33

## 2024-01-23 RX ADMIN — ATORVASTATIN CALCIUM 40 MG: 40 TABLET, FILM COATED ORAL at 20:16

## 2024-01-23 RX ADMIN — HEPARIN SODIUM 7500 UNITS: 5000 INJECTION INTRAVENOUS; SUBCUTANEOUS at 05:18

## 2024-01-23 ASSESSMENT — PAIN SCALES - GENERAL
PAINLEVEL_OUTOF10: 0 - NO PAIN

## 2024-01-23 ASSESSMENT — PAIN - FUNCTIONAL ASSESSMENT
PAIN_FUNCTIONAL_ASSESSMENT: 0-10

## 2024-01-23 NOTE — NURSING NOTE
CHF Clinical Nurse Navigator Documentation    Congestive Heart Failure disease education was performed by the Clinical Nurse Navigator with a good understanding. Yes    CHF signs and symptoms discussed and when to call cardiologist?  Yes  Living With Heart Failure Education booklet?  No  Controlling Heart Failure at Home Education? Yes  CHF Education Teaching Tool? Yes  Home medication usage?  Yes  Nutrition Education? Yes, Low Sodium Diet  Daily Weight Education? Yes, Daily Weight Log given and reviewed  Cardiovascular Rehab Referral ordered?  No  Follow-up with Cardiologist after discharge education? Yes    Comments: Patient resting comfortably and receiving supplemental oxygen. Discussed CHF signs and symptoms and the importance of limiting sodium intake to 2,000 mg per day to prevent fluid retention. Discussed reading food labels and reviewed foods that are better to choose from and foods that are better to avoid. Offered to have Dietician come speak with patient and patient declined at this time. Discussed history of sleep apnea and the importance of having an outpatient sleep study. Patient states he has an appointment for an outpatient sleep study in the beginning of February. Discussed plan for home going oxygen and emphasized the importance of ongoing follow up. Patient appreciated education and verbalized understanding of teaching provided.

## 2024-01-23 NOTE — PROGRESS NOTES
This  met with patient and completed POA paperwork with him. He identified his mother as his decision maker. SW put copy in chart and gave originals and copy to patient. SW talked with patient about FMLA and the potential he might lose his job. Patient doesn't qualify for FMLA because he hasn't been there for a year. SW encouraged him to call his  at his job. He stated he will.  will continue to follow. Message with questions.  SPENCER Loomis

## 2024-01-23 NOTE — PROGRESS NOTES
"    Endocrinology Inpatient Consult Progress Note     PATIENT NAME: Tawanda Lomeli  MRN: [id number]  DATE: 1/23/2024    CONSULTING PHYSICIAN: Dr. KHLOE Falcon   REASON FOR CONSULT: Uncontrolled T2DM.        Interval Events     No overnight events.  Off arivo, on nasal canula.   States he is eating regular diet well.  No nausea or vomiting.      Physical Examination     /60   Pulse 105   Temp 36.4 °C (97.5 °F)   Resp 26   Ht 1.702 m (5' 7.01\")   Wt (!) 155 kg (342 lb 6 oz)   SpO2 94%   BMI 53.61 kg/m²   No acute distress.  Nasal canula in place.  Acanthosis nigricans on the neck  Regular rate and rhythm.  Nonlabored respiration.  Soft nontender nondistended abdomen.  No pedal edema bilaterally.  No rashes.      Medications     Reviewed MAR       Data     Recent Labs and Imaging Reviewed      Assessment / Plan        # Uncontrolled Type 2 Diabetes Mellitus  Home Regimen: Metformin 1 g BID.  Dulaglutide 3 mg qWK. U500 36-36-36.  Hemoglobin A1c (10/24): 6.3%  Nutrition: CHO controlled     Sugars have been at goal. Has not required SS coverage.     -Cont Glargine 10 units nightly  -Cont Lispro SS  -Will start prandial insulin once diet improves as I suspect he will have some hyperglycemia once he is able to eat.     # Acute Hypercarbic Resp Fx: Likely secondary to RSV infection.  Patient is weaned to nasal canula comfortably. Not requiring steroids.      # Hypertension: On lisinopril 20 mg daily at home.  This has been held as an inpatient.     # Dyslipidemia: Reviewed his lipid panel on admission.  His LDL cholesterol as well as triglycerides are at goal.  The patient is maintained on atorvastatin 40 mg as well as fenofibrate 160 mg.  I reviewed his endocrinology note.     # NAFLD: Agree with use of a GLP-1 receptor analog.  Consider addition of pioglitazone.  The patient does have many signs and symptoms of insulin resistance on exam.  Pioglitazone has also been shown to decrease histological findings of NAFLD in " patients with T2DM.     # Obesity: Consider 1 mg overnight dexamethasone suppression test to rule out Cushing's as an outpatient.       Archie Vivar DO   Internal Medicine PGY-3     _________________________  STAFF TEACHING ADDENDUM  I have reviewed the progress note obtained and documented by Dr. Vivar and I personally participated in the key components. I have discussed the case and management of the patient's care with Dr. Vivar. I have edited the note as needed.    Jose R Falcon, DO  Endocrinology, Diabetes, and Metabolism  1/23/2024 10:13 AM    Please excuse and typographical or unwanted errors within this documentation as voice recognition software was used to dictate this note.   __________________________

## 2024-01-23 NOTE — DISCHARGE INSTRUCTIONS
Recommend following up with PCP in the next 1-2 weeks regarding recent hospitalization. Patient does not currently have a PCP. Will recommend the following physicians:   Dr. Dc Helms, DO    [facility]   [address]   [place] [address]   Call [phone] to schedule     Dr. Palomo Allen, DO    [facility]   [address]   [place] [address]   Call [phone] to schedule    2.  Follow-up with cardiology in the next 1 to 2 weeks regarding recent hospitalization and right-sided heart failure exacerbation.  No need for diuretics at this time. Call [phone] to schedule an appointment today.    3.  Keep your appointment to follow-up with sleep study regarding further evaluation of your previous diagnosis of obstructive sleep apnea.  You will be discharged home on 2 L nasal cannula.  Will be important that you establish and follow-up with a pulmonologist before.    4.  Follow-up with your endocrinologist regarding your recent hospitalization.  Continue your home diabetic medication regimen.    5.  Follow-up with your dermatologist regarding regarding psoriasis for possible further treatment options.      HEART FAILURE EDUCATION:  1. Weigh yourself daily and record on your weight log.  2. If you gain more than 2 or 3 pounds overnight, call your cardiologist.  3. Follow a low sodium diet. No more than 2000 mg in one day, or more than 650 mg per meal.  4. Limit total fluids to no more than 8 cups (or 2 liters) per day - this includes all fluids (water, coffee, juice, milk, tea, etc.)  5. Monitor your blood pressure daily and record on your weight log.  6. Call to schedule your follow-up appointments when you get home if they were not already scheduled for you.  7. Keep your follow-up appointments! Bring your weight log with you so the doctors can see your weight trend and blood pressure readings.  8. Be sure to  any new prescriptions  and take them as directed. If unsure of the medications, be sure to call your cardiologist.  9. Stay as active as you can tolerate.   10. If you notice subtle change of symptoms (slight increase in swelling, slight shortness of breath, a new intolerance to laying flat, a new cough), be sure to call your cardiologist.

## 2024-01-23 NOTE — PROGRESS NOTES
Subjective   Tawanda Lomeli is a 28 y.o. male who presents with Shortness of Breath.    Patient seen this morning sitting up in chair. He is no longer on airvo; currently on 6L NC. He states that he is feeling better. ICU team signed off on patient yesterday.    Objective   Vitals:    01/23/24 1100   BP: 129/72   Pulse: 105   Resp: 19   Temp:    SpO2: 99%      Physical Exam  Physical Exam:  Constitutional: obese, alert, cooperative, A&Ox3  ENMT: mucous membranes moist, EOMI, conjunctivae clear  Head/Neck: normocephalic, atraumatic; supple, trachea midline  Respiratory/Thorax: patent airways, CTAB; no wheezes, rales, or rhonchi  Cardiovascular: RRR, no murmur  Gastrointestinal: soft, nondistended, non-tender, bowel sounds appreciated  Extremities: palpable peripheral pulses, no edema or cyanosis  Neurological: AO x3, no focal deficits  Psychological: appropriate mood and behavior  Skin: diffuse scaly patches/plaque over trunk, extremities and posterior neck    Assessment/Plan       Tawanda Lomeli is a 28-year-old male with PMH type 2 diabetes, ERIK, obesity, poorly controlled psoriasis who presented to [facility] with shortness of breath and is being treated for acute hypoxic respiratory secondary to CHF versus pneumonia and/or RSV positive.    Acute medical conditions:  #Acute hypoxic hypercapnic respiratory failure  #OHS/ERIK noncompliant on CPAP  #RSV pneumonia  VBG on admission showed pH 7.27, pCO2 77, bicarb 35.4  Patient diagnosed with ERIK at age 13, noncompliant with CPAP since age 18  CTA was ordered which showed no signs of PE; CXR showed bilateral patchy infiltrate  -Azithromycin last dose 1/23 and Rocephin til 1/25; urine antigens negative  -Procal 0.27, lactate 0.7  -Repeat ABG 1/21/24  showed mild improvement; pH 7.32, PCO2 75, HCO3 38.6  -Currently off airvo, tolerating NC 6L well    #Acute decompensated heart failure suspected  Given total 80 mg IV lasix on admission 1/20  BNP elevated at 251  -Cardiology  consulted, hold lasix at this time, will continue to follow  -Echo showed EF 50% 1/23    #T2DM  #Morbid obesity  #NAFLD 2/2 morbid obesity   Last HAC1 6.3 on 10/24/23  Home medications include: Trulicity, metformin - held  -Endocrinology consulted, appreciate recommendations  -Ordered SSI #2 and Lanus 10U nightly    Chronic medical conditions:  #Psoriasis  Patient has appointment with dermatologist for this Friday regarding his psoriasis   -Hydrocortisone as needed, wound care consulted    DVT PPX: SCDs, heparin  Diet: diabetic diet  IVF: None  Code Status: FULL    This is a preliminary note written by the resident. Please wait for attending addendum for finalization of note and recommendations.    Feliciano Page DO, PhD  Internal Medicine PGY1

## 2024-01-23 NOTE — CARE PLAN
The patient's goals for the shift include  maintain oxygenation.    The clinical goals for the shift include maintain oxygen saturation 92% or >.    Over the shift, the patient did not make progress toward the following goals.

## 2024-01-23 NOTE — PROGRESS NOTES
Met with pt/mother, plan is for home at discharge, pt may need home 02, currently is on 5 LHF, alerted Ana liaison for possible DME, if pt needs hhc preference is [facility].   Team to follow.  Erica San RN TCC

## 2024-01-24 VITALS
TEMPERATURE: 97.2 F | BODY MASS INDEX: 49.44 KG/M2 | OXYGEN SATURATION: 93 % | DIASTOLIC BLOOD PRESSURE: 77 MMHG | WEIGHT: 315 LBS | SYSTOLIC BLOOD PRESSURE: 122 MMHG | HEIGHT: 67 IN | HEART RATE: 110 BPM | RESPIRATION RATE: 26 BRPM

## 2024-01-24 LAB
ALBUMIN SERPL BCP-MCNC: 3.8 G/DL (ref 3.4–5)
ALP SERPL-CCNC: 50 U/L (ref 33–120)
ALT SERPL W P-5'-P-CCNC: 12 U/L (ref 10–52)
ANION GAP SERPL CALC-SCNC: 9 MMOL/L (ref 10–20)
AST SERPL W P-5'-P-CCNC: 22 U/L (ref 9–39)
BACTERIA BLD CULT: NORMAL
BACTERIA BLD CULT: NORMAL
BILIRUB SERPL-MCNC: 0.9 MG/DL (ref 0–1.2)
BUN SERPL-MCNC: 12 MG/DL (ref 6–23)
CALCIUM SERPL-MCNC: 8.5 MG/DL (ref 8.6–10.3)
CHLORIDE SERPL-SCNC: 93 MMOL/L (ref 98–107)
CO2 SERPL-SCNC: 38 MMOL/L (ref 21–32)
CREAT SERPL-MCNC: 0.82 MG/DL (ref 0.5–1.3)
EGFRCR SERPLBLD CKD-EPI 2021: >90 ML/MIN/1.73M*2
ERYTHROCYTE [DISTWIDTH] IN BLOOD BY AUTOMATED COUNT: 16.6 % (ref 11.5–14.5)
GLUCOSE BLD MANUAL STRIP-MCNC: 125 MG/DL (ref 74–99)
GLUCOSE SERPL-MCNC: 120 MG/DL (ref 74–99)
HCT VFR BLD AUTO: 48.5 % (ref 41–52)
HGB BLD-MCNC: 13.7 G/DL (ref 13.5–17.5)
MAGNESIUM SERPL-MCNC: 1.94 MG/DL (ref 1.6–2.4)
MCH RBC QN AUTO: 22.3 PG (ref 26–34)
MCHC RBC AUTO-ENTMCNC: 28.2 G/DL (ref 32–36)
MCV RBC AUTO: 79 FL (ref 80–100)
NRBC BLD-RTO: 0 /100 WBCS (ref 0–0)
PLATELET # BLD AUTO: 245 X10*3/UL (ref 150–450)
POTASSIUM SERPL-SCNC: 4.6 MMOL/L (ref 3.5–5.3)
PROT SERPL-MCNC: 7.4 G/DL (ref 6.4–8.2)
RBC # BLD AUTO: 6.13 X10*6/UL (ref 4.5–5.9)
SODIUM SERPL-SCNC: 135 MMOL/L (ref 136–145)
WBC # BLD AUTO: 7.5 X10*3/UL (ref 4.4–11.3)

## 2024-01-24 PROCEDURE — 80053 COMPREHEN METABOLIC PANEL: CPT

## 2024-01-24 PROCEDURE — 94640 AIRWAY INHALATION TREATMENT: CPT | Performed by: STUDENT IN AN ORGANIZED HEALTH CARE EDUCATION/TRAINING PROGRAM

## 2024-01-24 PROCEDURE — 36415 COLL VENOUS BLD VENIPUNCTURE: CPT

## 2024-01-24 PROCEDURE — 99239 HOSP IP/OBS DSCHRG MGMT >30: CPT

## 2024-01-24 PROCEDURE — 94640 AIRWAY INHALATION TREATMENT: CPT

## 2024-01-24 PROCEDURE — 85027 COMPLETE CBC AUTOMATED: CPT

## 2024-01-24 PROCEDURE — 94761 N-INVAS EAR/PLS OXIMETRY MLT: CPT

## 2024-01-24 PROCEDURE — 2500000001 HC RX 250 WO HCPCS SELF ADMINISTERED DRUGS (ALT 637 FOR MEDICARE OP): Performed by: STUDENT IN AN ORGANIZED HEALTH CARE EDUCATION/TRAINING PROGRAM

## 2024-01-24 PROCEDURE — 2500000004 HC RX 250 GENERAL PHARMACY W/ HCPCS (ALT 636 FOR OP/ED): Performed by: STUDENT IN AN ORGANIZED HEALTH CARE EDUCATION/TRAINING PROGRAM

## 2024-01-24 PROCEDURE — 5A0935A ASSISTANCE WITH RESPIRATORY VENTILATION, LESS THAN 24 CONSECUTIVE HOURS, HIGH NASAL FLOW/VELOCITY: ICD-10-PCS | Performed by: STUDENT IN AN ORGANIZED HEALTH CARE EDUCATION/TRAINING PROGRAM

## 2024-01-24 PROCEDURE — 83735 ASSAY OF MAGNESIUM: CPT

## 2024-01-24 PROCEDURE — 82947 ASSAY GLUCOSE BLOOD QUANT: CPT

## 2024-01-24 RX ADMIN — FENOFIBRATE 160 MG: 160 TABLET, FILM COATED ORAL at 08:10

## 2024-01-24 RX ADMIN — CEFTRIAXONE SODIUM 1 G: 1 INJECTION, SOLUTION INTRAVENOUS at 01:25

## 2024-01-24 RX ADMIN — HEPARIN SODIUM 7500 UNITS: 5000 INJECTION INTRAVENOUS; SUBCUTANEOUS at 06:14

## 2024-01-24 RX ADMIN — FLUTICASONE FUROATE AND VILANTEROL TRIFENATATE 1 PUFF: 100; 25 POWDER RESPIRATORY (INHALATION) at 08:16

## 2024-01-24 ASSESSMENT — COGNITIVE AND FUNCTIONAL STATUS - GENERAL
DAILY ACTIVITIY SCORE: 24
MOBILITY SCORE: 24

## 2024-01-24 ASSESSMENT — PAIN - FUNCTIONAL ASSESSMENT
PAIN_FUNCTIONAL_ASSESSMENT: 0-10

## 2024-01-24 ASSESSMENT — PAIN SCALES - GENERAL
PAINLEVEL_OUTOF10: 0 - NO PAIN

## 2024-01-24 NOTE — DISCHARGE SUMMARY
"Discharge Diagnosis  Shortness of breath    Issues Requiring Follow-Up  OHS/ERIK  RSV pneumonia  Acute decompensated right tipton failure  Type 2 diabetes  NAFLD    Discharge Meds     Your medication list        CHANGE how you take these medications        Instructions Last Dose Given Next Dose Due   HumuLIN R U-500 (Conc) Insulin 500 unit/mL CONCENTRATED injection  Generic drug: insulin regular  What changed:   how much to take  how to take this  when to take this      Inject 36 units 3 times  daily as directed       metFORMIN 1,000 mg tablet  Commonly known as: Glucophage  What changed: Another medication with the same name was removed. Continue taking this medication, and follow the directions you see here.                  CONTINUE taking these medications        Instructions Last Dose Given Next Dose Due   atorvastatin 40 mg tablet  Commonly known as: Lipitor           fenofibrate 160 mg tablet  Commonly known as: Triglide           hydrocortisone 2.5 % ointment           insulin syringe-needle U-100 30G X 1/2\" 0.5 mL syringe           lisinopril 20 mg tablet      Take 1 tablet (20 mg) by mouth once daily.       Trulicity 1.5 mg/0.5 mL pen injector injection  Generic drug: dulaglutide      Inject 1.5 mg under the skin 1 (one) time per week.              STOP taking these medications      pravastatin 40 mg tablet  Commonly known as: Pravachol        triamcinolone 0.1 % cream  Commonly known as: Kenalog                 Test Results Pending At Discharge  Pending Labs       Order Current Status    Blood Culture Preliminary result    Blood Culture Preliminary result            Hospital Course   Tawanda Lomeli is a 28-year-old male with past medical history of uncontrolled type 2 diabetes, nonalcoholic fatty liver disease, hypertension, dyslipidemia, class III obesity, ERIK who presented to [facility] with shortness of breath. In the ER, CBC was remarkable for a MCV of 77.  CMP showed elevated glucose 150 mg/dL.  The patient's " troponin was found to be 23.  BNP was elevated to 251.  The patient's VBG showed a respiratory acidosis with a pH of 7.27.  Beta-hydroxybutyrate was normal.  Patient's INR was found to be 1.7.  D-dimer was found to be 540.  Patient had a lipid panel drawn which showed a LDL cholesterol of 32 with triglycerides of 68.  His procalcitonin level was elevated to 0.27.  Serum lactate was found to be 0.7.  Patient's RSV PCR came back positive.  Plain radiograph of the chest showed a enlarged cardiomediastinal silhouette with small lung volumes and patchy airspace opacities in the lower lobes.  The patient underwent a Doppler of his bilateral lower extremities which showed no DVT.  He underwent a CT PE which showed no pulmonary embolus but it was not a very conclusive study.  The patient did have patchy multifocal infiltrates in the lungs.  He was subsequently admitted to the medical intensive care unit for management of his acute hypercarbic respiratory acidosis in the setting of RSV infection.  Cardiology was consulted for suspected new onset decompensated right-sided heart failure.  Patient was transition of continuous CPAP to Airvo to nasal cannula.  I reviewed O2 status patient will be discharged home with supplemental oxygen.  Patient does not have a PCP I have placed recommendations for him to follow-up with.  He is also to follow-up with cardiology in 1 to 2 weeks regarding his recent right-sided heart failure exacerbation.  He is to keep his appointment for sleep study later this month for reevaluation of his obstructive sleep apnea.  He also to follow-up with his endocrinologist regarding his diabetic regimen.  Patient is to follow-up with his dermatologist regarding his psoriasis for further treatment options.  Patient is stable for discharged home.    Pertinent Physical Exam At Time of Discharge  Physical Exam  Constitutional: obese, alert, cooperative, A&Ox3  ENMT: mucous membranes moist, EOMI, conjunctivae  clear  Head/Neck: normocephalic, atraumatic; supple, trachea midline  Respiratory/Thorax: patent airways, CTAB; no wheezes, rales, or rhonchi  Cardiovascular: RRR, no murmur  Gastrointestinal: soft, nondistended, non-tender, bowel sounds appreciated  Extremities: palpable peripheral pulses, no edema or cyanosis  Neurological: AO x3, no focal deficits  Psychological: appropriate mood and behavior  Skin: diffuse scaly patches/plaque over trunk, extremities and posterior neck    Outpatient Follow-Up  Future Appointments   Date Time Provider Department Center   1/26/2024  8:30 AM Gato Hung MD PhD [id number] [facility]   2/8/2024  9:00 AM Ruma Head, APRN-CNP [id number] [facility]   2/23/2024 10:20 AM Alfonso RODGERS MD [id number] [facility]   3/11/2024  3:00 PM Desiree Simmons MD [id number] [facility]   3/22/2024  8:00 AM Cecelia Payne MD [id number] [facility]   3/22/2024  9:30 AM Oneida Taylor DO [id number] [facility]   1/10/2025  1:00 PM Yudelka Wright DPM [id number] [facility]       Feliciano Page DO, PhD  Internal Medicine PGY1

## 2024-01-24 NOTE — DOCUMENTATION CLARIFICATION NOTE
"    PATIENT:               STEPHEN STEINBERG  ACCT #:                  [id number]  MRN:                       [id number]  :                       1995  ADMIT DATE:       2024 8:50 PM  DISCH DATE:  RESPONDING PROVIDER #:        [id number]          PROVIDER RESPONSE TEXT:    Hyponatremia is ruled out    CDI QUERY TEXT:    UH_CV Electrolyte        Instruction:    Based on your assessment of the patient and the clinical information, please provide the requested documentation by clicking on the appropriate radio button and enter any additional information if prompted.    Question: Please clarify the diagnosis of hyponatremia    When answering this query, please exercise your independent professional judgment. The fact that a question is being asked, does not imply that any particular answer is desired or expected.    The patient's clinical indicators include:  Clinical Information: Per H/P 24 by Dr. Pamela Hernandez \"28 y.o. male with a past medical history of diabetes presenting with shortness of breath.  The patient himself was very short of breath and it was difficult for him to discuss his medical history.  Fortunately his mother was present at bedside and corroborated his medical history.  She states that he was diagnosed with \"apnea\" as a child of 13 and that he was compliant with his breathing machine until the age of 18.  She states that he follows up with his diabetes related doctors, but that he has never had a pulmonologist.  Has a severe rash that is chronic in nature and stable. \"    -Documented Diagnosis: Per H/P 24 by Dr. Pamela Hernandez \"Hyponatremia\"  -Laboratory Results: Sodium levels 24  134,   24  134,   24  136,  24  136  -Clinical Manifestations/Physical Exam Findings: shortness of breath, RSV positive    Treatment: monitoring labs    Risk Factors: obesity, hypoventilation, HTN, CHF, DM  Options provided:  -- Hyponatremia clinically significant, as evidenced by and treated with, " Please specify additional information below  -- Hyponatremia is ruled out  -- Other - I will add my own diagnosis  -- Refer to Clinical Documentation Reviewer    Query created by: Simón Myers on 1/23/2024 2:54 PM      Electronically signed by:  TUAN PALOMARES DO 1/23/2024 9:42 PM

## 2024-01-24 NOTE — NURSING NOTE
Home Oxygen Assessment:    Pulse Oximetry on Room Air at Rest: 87%  Pulse Oximetry on Oxygen at Rest: 92% on 2 lpm nc  Pulse Oximetry on Room Air during Ambulation: NA  Pulse Oximetry on Oxygen during Ambulation: 92%  Amount of Oxygen during Ambulation: 2 lpm nc    Does the patient qualify for home oxygen: yes  Order obtained for home oxygen: yes    What is the patient's chronic pulmonary diagnosis: CHF  Which DME company did the patient choose: [employer]  DME notified of home oxygen needs: yes    Comments: Patient is currently requiring 2 lpm nc continuous.

## 2024-01-26 ENCOUNTER — OFFICE VISIT (OUTPATIENT)
Dept: DERMATOLOGY | Facility: CLINIC | Age: 29
End: 2024-01-26
Payer: COMMERCIAL

## 2024-01-26 ENCOUNTER — DOCUMENTATION (OUTPATIENT)
Dept: ENDOCRINOLOGY | Facility: HOSPITAL | Age: 29
End: 2024-01-26

## 2024-01-26 ENCOUNTER — LAB (OUTPATIENT)
Dept: LAB | Facility: LAB | Age: 29
End: 2024-01-26
Payer: COMMERCIAL

## 2024-01-26 DIAGNOSIS — L40.9 PSORIASIS: Primary | ICD-10-CM

## 2024-01-26 DIAGNOSIS — L40.9 PSORIASIS: ICD-10-CM

## 2024-01-26 DIAGNOSIS — E11.9 DIABETES MELLITUS TYPE 2 WITHOUT RETINOPATHY (MULTI): ICD-10-CM

## 2024-01-26 LAB
HBV CORE AB SER QL: NONREACTIVE
HBV SURFACE AB SER-ACNC: 13.5 MIU/ML
HBV SURFACE AG SERPL QL IA: NONREACTIVE
HCV AB SER QL: NONREACTIVE
HIV 1+2 AB+HIV1 P24 AG SERPL QL IA: NONREACTIVE

## 2024-01-26 PROCEDURE — 1036F TOBACCO NON-USER: CPT | Performed by: DERMATOLOGY

## 2024-01-26 PROCEDURE — 3008F BODY MASS INDEX DOCD: CPT | Performed by: DERMATOLOGY

## 2024-01-26 PROCEDURE — 87389 HIV-1 AG W/HIV-1&-2 AB AG IA: CPT

## 2024-01-26 PROCEDURE — 86481 TB AG RESPONSE T-CELL SUSP: CPT

## 2024-01-26 PROCEDURE — 86704 HEP B CORE ANTIBODY TOTAL: CPT

## 2024-01-26 PROCEDURE — 4010F ACE/ARB THERAPY RXD/TAKEN: CPT | Performed by: DERMATOLOGY

## 2024-01-26 PROCEDURE — 86706 HEP B SURFACE ANTIBODY: CPT

## 2024-01-26 PROCEDURE — 3060F POS MICROALBUMINURIA REV: CPT | Performed by: DERMATOLOGY

## 2024-01-26 PROCEDURE — 99214 OFFICE O/P EST MOD 30 MIN: CPT | Performed by: DERMATOLOGY

## 2024-01-26 PROCEDURE — 3048F LDL-C <100 MG/DL: CPT | Performed by: DERMATOLOGY

## 2024-01-26 PROCEDURE — 36415 COLL VENOUS BLD VENIPUNCTURE: CPT

## 2024-01-26 PROCEDURE — 86803 HEPATITIS C AB TEST: CPT

## 2024-01-26 PROCEDURE — 87340 HEPATITIS B SURFACE AG IA: CPT

## 2024-01-26 RX ORDER — TRIAMCINOLONE ACETONIDE 1 MG/G
CREAM TOPICAL 2 TIMES DAILY
Qty: 453.6 G | Refills: 3 | Status: SHIPPED | OUTPATIENT
Start: 2024-01-26 | End: 2024-02-09

## 2024-01-26 RX ORDER — DULAGLUTIDE 1.5 MG/.5ML
INJECTION, SOLUTION SUBCUTANEOUS
Qty: 2 ML | Refills: 3 | Status: SHIPPED | OUTPATIENT
Start: 2024-01-26 | End: 2024-03-22 | Stop reason: WASHOUT

## 2024-01-26 ASSESSMENT — ITCH NUMERIC RATING SCALE: HOW SEVERE IS YOUR ITCHING?: 5

## 2024-01-26 NOTE — PROGRESS NOTES
Subjective     Cedric Lomeli is a 28 y.o. male who presents for the following: Psoriasis (Was unable to get prescribed creams because he didn't have insurance - but he now has insurance. Psoriasis has progressed. ).     Patient last seen 5/19/23 for psoriasis with BSA >50%. Patient was started TAC 0.1% ointment BID, hydrocortisone 2.5% ointment BID, and planned to start Skyrizi or Tremfya. However, due to patient losing his job, he was unable to get and start any medication. Patient states that his psoriasis has gotten worse and thicker. Denies any joint pains. Reports itching diffusely.     Review of Systems:  No other skin or systemic complaints other than what is documented elsewhere in the note.    The following portions of the chart were reviewed this encounter and updated as appropriate:       Skin Cancer History  No skin cancer on file.    Specialty Problems          Dermatology Problems    Psoriasis vulgaris    Acanthosis nigricans     Past Medical History:  Cedric Lomeli  has no past medical history on file.    Past Surgical History:  Cedric Lomeli  has no past surgical history on file.    Family History:  Patient family history is not on file.    Social History:  Cedric Lomeli  reports that he has never smoked. He has never used smokeless tobacco. He reports that he does not drink alcohol and does not use drugs.    Allergies:  Aspirin    Current Medications / CAM's:    Current Outpatient Medications:     atorvastatin (Lipitor) 40 mg tablet, Take 1 tablet (40 mg) by mouth once daily at bedtime., Disp: , Rfl:     dulaglutide (Trulicity) 1.5 mg/0.5 mL pen injector injection, Inject 1.5 mg under the skin 1 (one) time per week. (Patient taking differently: Inject 1.5 mg under the skin 1 (one) time per week. Every Monday), Disp: 2 mL, Rfl: 1    fenofibrate (Triglide) 160 mg tablet, Take 1 tablet (160 mg) by mouth once daily., Disp: , Rfl:     HumuLIN R  "U-500, Conc, Insulin 500 unit/mL CONCENTRATED injection, Inject 36 units 3 times  daily as directed (Patient taking differently: Inject 0.04 mL (20 Units) under the skin once daily at bedtime. Inject 36 units 3 times  daily as directed), Disp: 50 mL, Rfl: 3    insulin syringe-needle U-100 30G X 1/2\" 0.5 mL syringe, Use 3 a day with insulin ., Disp: , Rfl:     lisinopril 20 mg tablet, Take 1 tablet (20 mg) by mouth once daily., Disp: 90 tablet, Rfl: 3    metFORMIN (Glucophage) 1,000 mg tablet, Take 1 tablet (1,000 mg) by mouth once daily in the evening. Take with meals., Disp: , Rfl:     hydrocortisone 2.5 % ointment, 1 Application, Disp: , Rfl:     triamcinolone (Kenalog) 0.1 % cream, Apply topically 2 times a day for 14 days., Disp: 453.6 g, Rfl: 3     Objective   Well appearing patient in no apparent distress; mood and affect are within normal limits.    A full examination was performed including scalp, head, eyes, ears, nose, lips, neck, chest, axillae, abdomen, back, buttocks, bilateral upper extremities, bilateral lower extremities, hands, feet, fingers, toes, fingernails, and toenails. All findings within normal limits unless otherwise noted below.    Assessment/Plan   1. Psoriasis  Well-demarcated thick, hyperkeratotic, erythematous papules and plaques with overlying silvery scale. BSA>50%. Bilateral fingernails with onycholysis     Psoriasis, severe with >70% BSA and thick plaques - involving the face, extremities, scalp, buttocks.   - PMHx of obesity, insulin-dependent diabetes (also on Trulicity), HTN, HLD, Associated with metabolic syndrome as patient is obese (310 lbs, 5'6''), has insulin-dependent diabetes mellitus, HTN, HLD.   - We reviewed the etiology of psoriasis with the patient. Psoriasis is a chronic inflammatory skin condition that occurs in approximately 1% to 2% of the population and presents with chronic scaly, well-defined red plaques with a predilection for the elbows and knees, but can be " present on all parts of the body. Treatment modalities from psoriasis are broad, ranging from topical therapies to phototherapy to systemic medications.   - Given that the patient has about >70% body surface area involvement with involvement of special sites, we recommended that he be treated with systemic agents at this time.   - Significant QoL impairment including itch, as well as pain that prevents ambulation at times   - Patient currently has insurance through his new job - will plan to start a biologic that he may get patient assistance for, likely Bimzelx  - Start triamcinolone 0.1% cream BID to the affected areas. Patient educated to not apply to face, skin folds, genitals.   - CBC w/ diff and CMP recently drawn on 1/24/24. Will obtain T spot, HIV, hepatitis C and hepatitis B antigen/antibody/total antibody.  - RTC 2 weeks for initiation of Bimzelx if lab work comes back negative.    Related Procedures  T-SPOT (Tb)  HIV 1/2 Antigen/Antibody Screen with Reflex to Confirmation  Hepatitis C Antibody  Hepatitis B Surface Antibody  Hepatitis B Core Antibody, Total  Hepatitis B Surface Antigen    Related Medications  triamcinolone (Kenalog) 0.1 % cream  Apply topically 2 times a day for 14 days.      Roger Hightower DO    I saw and evaluated the patient. I personally obtained the key and critical portions of the history and physical exam or was physically present for key and critical portions performed by the resident/fellow. I reviewed the resident/fellow's documentation and discussed the patient with the resident/fellow. I agree with the resident/fellow's medical decision making as documented in the note.    Gato Hung MD PhD

## 2024-01-27 LAB
ATRIAL RATE: 122 BPM
P AXIS: 15 DEGREES
PR INTERVAL: 153 MS
Q ONSET: 249 MS
QRS COUNT: 20 BEATS
QRS DURATION: 88 MS
QT INTERVAL: 320 MS
QTC CALCULATION(BAZETT): 456 MS
QTC FREDERICIA: 405 MS
R AXIS: 151 DEGREES
T AXIS: 44 DEGREES
T OFFSET: 409 MS
VENTRICULAR RATE: 122 BPM

## 2024-01-29 ENCOUNTER — PHARMACY VISIT (OUTPATIENT)
Dept: PHARMACY | Facility: CLINIC | Age: 29
End: 2024-01-29
Payer: COMMERCIAL

## 2024-01-29 LAB
NIL(NEG) CONTROL SPOT COUNT: NORMAL
PANEL A SPOT COUNT: 2
PANEL B SPOT COUNT: 4
POS CONTROL SPOT COUNT: NORMAL
T-SPOT. TB INTERPRETATION: NEGATIVE

## 2024-01-29 PROCEDURE — RXMED WILLOW AMBULATORY MEDICATION CHARGE

## 2024-02-01 ENCOUNTER — OFFICE VISIT (OUTPATIENT)
Dept: PRIMARY CARE | Facility: CLINIC | Age: 29
End: 2024-02-01
Payer: COMMERCIAL

## 2024-02-01 VITALS
SYSTOLIC BLOOD PRESSURE: 102 MMHG | HEIGHT: 67 IN | BODY MASS INDEX: 49.44 KG/M2 | DIASTOLIC BLOOD PRESSURE: 69 MMHG | WEIGHT: 315 LBS

## 2024-02-01 DIAGNOSIS — Z76.89 ENCOUNTER FOR SUPPORT AND COORDINATION OF TRANSITION OF CARE: ICD-10-CM

## 2024-02-01 DIAGNOSIS — G47.33 OBSTRUCTIVE SLEEP APNEA: Primary | ICD-10-CM

## 2024-02-01 DIAGNOSIS — E11.9 DIABETES MELLITUS TYPE 2 WITHOUT RETINOPATHY (MULTI): ICD-10-CM

## 2024-02-01 PROCEDURE — 99495 TRANSJ CARE MGMT MOD F2F 14D: CPT | Performed by: STUDENT IN AN ORGANIZED HEALTH CARE EDUCATION/TRAINING PROGRAM

## 2024-02-01 PROCEDURE — 90715 TDAP VACCINE 7 YRS/> IM: CPT | Performed by: STUDENT IN AN ORGANIZED HEALTH CARE EDUCATION/TRAINING PROGRAM

## 2024-02-01 PROCEDURE — 3060F POS MICROALBUMINURIA REV: CPT | Performed by: STUDENT IN AN ORGANIZED HEALTH CARE EDUCATION/TRAINING PROGRAM

## 2024-02-01 PROCEDURE — 4010F ACE/ARB THERAPY RXD/TAKEN: CPT | Performed by: STUDENT IN AN ORGANIZED HEALTH CARE EDUCATION/TRAINING PROGRAM

## 2024-02-01 PROCEDURE — 90471 IMMUNIZATION ADMIN: CPT | Performed by: STUDENT IN AN ORGANIZED HEALTH CARE EDUCATION/TRAINING PROGRAM

## 2024-02-01 PROCEDURE — 3078F DIAST BP <80 MM HG: CPT | Performed by: STUDENT IN AN ORGANIZED HEALTH CARE EDUCATION/TRAINING PROGRAM

## 2024-02-01 PROCEDURE — 3048F LDL-C <100 MG/DL: CPT | Performed by: STUDENT IN AN ORGANIZED HEALTH CARE EDUCATION/TRAINING PROGRAM

## 2024-02-01 PROCEDURE — 1036F TOBACCO NON-USER: CPT | Performed by: STUDENT IN AN ORGANIZED HEALTH CARE EDUCATION/TRAINING PROGRAM

## 2024-02-01 PROCEDURE — 3008F BODY MASS INDEX DOCD: CPT | Performed by: STUDENT IN AN ORGANIZED HEALTH CARE EDUCATION/TRAINING PROGRAM

## 2024-02-01 PROCEDURE — 3074F SYST BP LT 130 MM HG: CPT | Performed by: STUDENT IN AN ORGANIZED HEALTH CARE EDUCATION/TRAINING PROGRAM

## 2024-02-01 RX ORDER — LISINOPRIL 10 MG/1
10 TABLET ORAL DAILY
Qty: 90 TABLET | Refills: 3 | Status: SHIPPED | OUTPATIENT
Start: 2024-02-01 | End: 2025-01-31

## 2024-02-01 NOTE — PROGRESS NOTES
Subjective   Patient ID: Cedric Lomeli is a 28 y.o. male who presents for New Patient Visit ([facility] ), Hospital Follow-up (RSV; Pneumonia and CO2 low; BP was taken three times today: first 87/62; Second: 102/69; Third: 97/67), and Med Refill (Lisinopril).  [facility]  Jerel is here for hospital follow up and to establish care.     He has been feeling well since discharge from the hospital last week. He has been using 2L NC continuously since discharge, weaning down as tolerated. Maintaining O2 sats off of oxygen in the exam room today. He has lost weight since admission, he is eating less and working towards improved diet and continued weight loss. He has not been using insulin since discharge because his blood glucose has been consistently low without it and he has been eating much less. Blood glucose  per patient report. He is arranged to follow up with Sleep Medicine and Cardiology in the near future. He is planning to start biologic treatment for his widespread psoriasis    PMHx: ERIK/OHS, HTN, HFpEF, psoriasis  SurgHx: None  FamHx: HTN - mother, father,   SocialHx: Never smoker. Never vaped. Never drug use. Not currently drinking alcohol. He works as a .    Review of Systems  12-point ROS was reviewed and is negative, unless otherwise noted in HPI    Objective   Vitals:    02/01/24 1319   BP: 102/69      Physical Exam  GEN: alert, conversant, NAD  HEENT: PERRL, EOMI, MMM, Tms pearly gray bilaterally  NECK: supple, no LAD appreciated  CHEST: CTAB  CV: S1, S2, RRR, no murmurs appreciated  ABD: soft, NT, ND, obese  EXT: no significant LE edema  SKIN: diffuse scaly plaque consistent with psoriasis across abdomen and extensor surfaced extremities    Assessment/Plan   #transitional care management  #acute hypoxic/hypercapnic respiratory failure  #RSV, resolved  -Reviewed recent hospitalization, including: labwork, imaging, documentation and reconciled current medications with patient  -  Continue weaning supplemental oxygen, continue nightly  - keep follow up appointments with specialists as scheduled    #Obesity, Class III  #ERIK/OHS  - Counseled continued efforts on lifestyle modification including weight loss, diet, and increased exercise for >5 minutes  - Following up with Sleep Medicine as scheduled  - Will benefit from likely home BiPAP when able to get this arranged    # HTN  #HFpEF  Recent low BP readings consistently  systolic  - Reduce Lisinopril to 10mg daily  Discussed DASH diet and dietary sodium restrictions.   Increase dietary efforts and physical activity.   - Follow up with Cardiology as scheduled     # Diabetes Mellitus type 2  He has not been using insulin since getting out of the hospital  Did have a hypoglycemic episode   Follows with Endocrinology and has a follow up appointment with Endo in March   Continue current management: Trulicity 1.5mg weekly and Metformin 1000mg BID  Lipid panel, microalbumin checked less than a year ago.  Increase dietary efforts and physical activity.  Routine diabetic retinopathy screening, foot exam/monofilament testing screening: up-to-date.    #psoriasis  Following with dermatology, has previously been maintained on topicals  - Planning to initiate biologic treatment next week    Health Maintenance:  Vaccines: COVID (x3), Flu, TDAP (update today), pneumonia (2013)  Screening: N/A  Labs: Reviewed recent, none needed today     RTC in ~3 months, or sooner PRN    Dc Helms,

## 2024-02-08 ENCOUNTER — OFFICE VISIT (OUTPATIENT)
Dept: NEUROSURGERY | Facility: CLINIC | Age: 29
End: 2024-02-08
Payer: COMMERCIAL

## 2024-02-08 VITALS
WEIGHT: 295 LBS | TEMPERATURE: 96.8 F | HEIGHT: 66 IN | BODY MASS INDEX: 47.41 KG/M2 | DIASTOLIC BLOOD PRESSURE: 86 MMHG | SYSTOLIC BLOOD PRESSURE: 110 MMHG | HEART RATE: 110 BPM

## 2024-02-08 DIAGNOSIS — R20.2 NUMBNESS AND TINGLING OF BOTH FEET: Primary | ICD-10-CM

## 2024-02-08 DIAGNOSIS — R20.0 NUMBNESS AND TINGLING OF BOTH FEET: Primary | ICD-10-CM

## 2024-02-08 DIAGNOSIS — G89.29 CHRONIC MIDLINE LOW BACK PAIN WITHOUT SCIATICA: ICD-10-CM

## 2024-02-08 DIAGNOSIS — M54.16 LUMBAR RADICULOPATHY: ICD-10-CM

## 2024-02-08 DIAGNOSIS — M54.50 CHRONIC MIDLINE LOW BACK PAIN WITHOUT SCIATICA: ICD-10-CM

## 2024-02-08 PROCEDURE — 3079F DIAST BP 80-89 MM HG: CPT | Performed by: NURSE PRACTITIONER

## 2024-02-08 PROCEDURE — 3074F SYST BP LT 130 MM HG: CPT | Performed by: NURSE PRACTITIONER

## 2024-02-08 PROCEDURE — 99203 OFFICE O/P NEW LOW 30 MIN: CPT | Performed by: NURSE PRACTITIONER

## 2024-02-08 PROCEDURE — 3008F BODY MASS INDEX DOCD: CPT | Performed by: NURSE PRACTITIONER

## 2024-02-08 PROCEDURE — 3048F LDL-C <100 MG/DL: CPT | Performed by: NURSE PRACTITIONER

## 2024-02-08 PROCEDURE — 4010F ACE/ARB THERAPY RXD/TAKEN: CPT | Performed by: NURSE PRACTITIONER

## 2024-02-08 PROCEDURE — 3060F POS MICROALBUMINURIA REV: CPT | Performed by: NURSE PRACTITIONER

## 2024-02-08 PROCEDURE — 1036F TOBACCO NON-USER: CPT | Performed by: NURSE PRACTITIONER

## 2024-02-08 ASSESSMENT — PAIN SCALES - GENERAL: PAINLEVEL: 0-NO PAIN

## 2024-02-08 ASSESSMENT — PATIENT HEALTH QUESTIONNAIRE - PHQ9
SUM OF ALL RESPONSES TO PHQ9 QUESTIONS 1 AND 2: 0
1. LITTLE INTEREST OR PLEASURE IN DOING THINGS: NOT AT ALL
2. FEELING DOWN, DEPRESSED OR HOPELESS: NOT AT ALL

## 2024-02-08 ASSESSMENT — ENCOUNTER SYMPTOMS: OCCASIONAL FEELINGS OF UNSTEADINESS: 0

## 2024-02-08 NOTE — PROGRESS NOTES
"It was a pleasure to see Cedric Lomeli on 2/8/2024. He is a 28 y.o. year old, right-handed male who presents to the [facility] for evaluation of lumbar radiculopathy. Patient is referred by Yudelka Wright DPM. PMH is significant for HTN / HPL, DM2, ERIK, Psoriasis, morbid obesity. He lives at home with his parents. 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    Cedric Lomeli has had symptoms of LBP for the past 6 - 9 months. He has noted that he has paresthesia in bilateral feet when sitting with legs elevated. This has progressed over the past 2 months. Symptoms resolve with standing. Progression of symptoms prompted today's visit. Thus far, patient has tried position change with little to no improvement of symptoms. He denies change in bowel / bladder function, saddle anesthesia, imbalance, falls, difficulty dressing, difficulty holding / opening objects.     PREVIOUS TREATMENTS  NSAIDs  Topical    Previous Spine Surgery: No     Smoker: No   Anticoagulation / Antiplatelets: No     ROS: 12 / 12 systems reviewed and are negative unless noted in HPI    /86 (BP Location: Left arm, Patient Position: Sitting, BP Cuff Size: Large adult)   Pulse 110   Temp 36 °C (96.8 °F) (Temporal)   Ht 1.676 m (5' 6\")   Wt 134 kg (295 lb)   BMI 47.61 kg/m²     ON EXAM:  General: Well developed, awake/alert/oriented x 3, no distress, alert and cooperative  Skin: Warm and dry, crusting psoriatic lesions noted throughout  ENMT: Mucous membranes moist, no apparent injury  Head/Neck: No apparent injury  Respiratory/Thorax: Normal breathing with good chest expansion, thorax symmetric  Cardiovascular: No JVD  Gastrointestinal: Non-distended  NEUROLOGICAL EXAM:  EOMI, face symmetric  Motor Strength: 5/5 in BLE  Muscle Tone: Normal without spasticity or contractures in all extremities  Muscle Bulk: Normal and symmetric in all extremities  Posture:  -- Cervical: Normal  -- " Thoracic: Normal  -- Lumbar : Normal  Paraspinal muscle spasm/tenderness absent.  No palpable tenderness along the spinous processes.  Sensation: SILT in BLE  Gait: Normal  Deep Tendon Reflexes: 1+ bilateral patellae. Babinski is up going bilaterally        TaePeter Lomeli has symptoms of lumbar radiculopathy and peripheral neuropathy. We discussed rationale for dynamic lumbar imaging and for Physical Therapy for Home Exercise Program, and for EMG / NCS to evaluate for peripheral diabetes. Encouraged follow up in 6 - 8weeks for reassessment. If not improved, will consider MRI L Spine.  He verbalizes understanding and agreement with plan.  Ruma Head, APRN-CNP

## 2024-02-08 NOTE — LETTER
February 8, 2024     Patient: Cedric Lomeli   YOB: 1995   Date of Visit: 2/8/2024       To Whom It May Concern:    Cedric Lomeli was seen in my clinic on 2/8/2024 at 9:00 am. Please excuse Cedric for his absence from work on this day to make the appointment.    If you have any questions or concerns, please don't hesitate to call.         Sincerely,         Ruma Head, APRN-CNP        CC: No Recipients

## 2024-02-09 ENCOUNTER — OFFICE VISIT (OUTPATIENT)
Dept: DERMATOLOGY | Facility: CLINIC | Age: 29
End: 2024-02-09
Payer: COMMERCIAL

## 2024-02-09 DIAGNOSIS — L40.9 PSORIASIS: Primary | ICD-10-CM

## 2024-02-09 PROCEDURE — 3008F BODY MASS INDEX DOCD: CPT | Performed by: DERMATOLOGY

## 2024-02-09 PROCEDURE — 3048F LDL-C <100 MG/DL: CPT | Performed by: DERMATOLOGY

## 2024-02-09 PROCEDURE — 99213 OFFICE O/P EST LOW 20 MIN: CPT | Performed by: DERMATOLOGY

## 2024-02-09 PROCEDURE — 3060F POS MICROALBUMINURIA REV: CPT | Performed by: DERMATOLOGY

## 2024-02-09 PROCEDURE — 1036F TOBACCO NON-USER: CPT | Performed by: DERMATOLOGY

## 2024-02-09 PROCEDURE — 4010F ACE/ARB THERAPY RXD/TAKEN: CPT | Performed by: DERMATOLOGY

## 2024-02-09 ASSESSMENT — ITCH NUMERIC RATING SCALE: HOW SEVERE IS YOUR ITCHING?: 6

## 2024-02-09 NOTE — PROGRESS NOTES
Subjective     Cedric Lomeli is a 28 y.o. male who presents for the following: psoriasis. Since last visit, patient has been using his triamcinolone as prescribed and reports a little improvement. He notes that one of the large plaques on his abdomen started to peel off. Plan at last visit was to start Bimzelx pending further lab testing, which the patient completed. We are still awaiting insurance approval for this medication.    Review of Systems:  No other skin or systemic complaints other than what is documented elsewhere in the note.    The following portions of the chart were reviewed this encounter and updated as appropriate:          Skin Cancer History  No skin cancer on file.      Specialty Problems          Dermatology Problems    Psoriasis vulgaris    Acanthosis nigricans        Objective   Well appearing patient in no apparent distress; mood and affect are within normal limits.    A focused skin examination was performed. All findings within normal limits unless otherwise noted below.    Assessment/Plan   1. Psoriasis  Well-demarcated erythematous papules and plaques with overlying silvery scale.    - Patient reports that triamcinolone has helped improve some of his itch, but he still has several plaques which are dry, pruritic, and occasionally peel or flake off  - BSA >70% involved  - Labs reviewed including T spot, LFTs, Hep B/C, and HIV which were negative with the exception of Hep B surface Ab, reflecting vaccination  - While waiting for insurance approval, will start patient on Bimzelx samples today  - Counseled patient on r/b/a of this medication  - While have patient RTC in 4 weeks for repeat clinic-administered Bimzelx sample (nurse visit)    Related Medications  triamcinolone (Kenalog) 0.1 % cream  Apply topically 2 times a day for 14 days.      RTC in 4 weeks for nurse visit for next injection.    Leigh Reyes MD, MARY  PGY-2, Department of Dermatology    I saw and evaluated  the patient. I personally obtained the key and critical portions of the history and physical exam or was physically present for key and critical portions performed by the resident/fellow. I reviewed the resident/fellow's documentation and discussed the patient with the resident/fellow. I agree with the resident/fellow's medical decision making as documented in the note.    Gato Hung MD PhD

## 2024-02-16 DIAGNOSIS — L40.0 PSORIASIS VULGARIS: ICD-10-CM

## 2024-02-18 RX ORDER — BIMEKIZUMAB 160 MG/ML
320 INJECTION, SOLUTION SUBCUTANEOUS SEE ADMIN INSTRUCTIONS
Qty: 1 ML | Refills: 5 | Status: SHIPPED | OUTPATIENT
Start: 2024-02-18

## 2024-02-18 RX ORDER — BIMEKIZUMAB 160 MG/ML
320 INJECTION, SOLUTION SUBCUTANEOUS
Qty: 1 ML | Refills: 6 | Status: SHIPPED | OUTPATIENT
Start: 2024-02-18 | End: 2024-04-05 | Stop reason: SDUPTHER

## 2024-02-20 ENCOUNTER — SPECIALTY PHARMACY (OUTPATIENT)
Dept: PHARMACY | Facility: CLINIC | Age: 29
End: 2024-02-20

## 2024-02-23 ENCOUNTER — APPOINTMENT (OUTPATIENT)
Dept: PRIMARY CARE | Facility: CLINIC | Age: 29
End: 2024-02-23
Payer: COMMERCIAL

## 2024-02-27 DIAGNOSIS — E66.01 CLASS 3 SEVERE OBESITY DUE TO EXCESS CALORIES WITH SERIOUS COMORBIDITY AND BODY MASS INDEX (BMI) OF 50.0 TO 59.9 IN ADULT (MULTI): Primary | ICD-10-CM

## 2024-02-27 DIAGNOSIS — E11.9 DIABETES MELLITUS TYPE 2 WITHOUT RETINOPATHY (MULTI): ICD-10-CM

## 2024-02-27 PROCEDURE — RXMED WILLOW AMBULATORY MEDICATION CHARGE

## 2024-02-27 RX ORDER — SEMAGLUTIDE 1.34 MG/ML
1 INJECTION, SOLUTION SUBCUTANEOUS
Qty: 3 ML | Refills: 3 | Status: SHIPPED | OUTPATIENT
Start: 2024-02-27 | End: 2024-05-17 | Stop reason: SDUPTHER

## 2024-02-28 ENCOUNTER — PHARMACY VISIT (OUTPATIENT)
Dept: PHARMACY | Facility: CLINIC | Age: 29
End: 2024-02-28
Payer: COMMERCIAL

## 2024-03-02 ENCOUNTER — HOSPITAL ENCOUNTER (OUTPATIENT)
Dept: RADIOLOGY | Facility: CLINIC | Age: 29
Discharge: HOME | End: 2024-03-02
Payer: COMMERCIAL

## 2024-03-02 DIAGNOSIS — R20.2 NUMBNESS AND TINGLING OF BOTH FEET: ICD-10-CM

## 2024-03-02 DIAGNOSIS — R20.0 NUMBNESS AND TINGLING OF BOTH FEET: ICD-10-CM

## 2024-03-02 DIAGNOSIS — M54.50 CHRONIC MIDLINE LOW BACK PAIN WITHOUT SCIATICA: ICD-10-CM

## 2024-03-02 DIAGNOSIS — G89.29 CHRONIC MIDLINE LOW BACK PAIN WITHOUT SCIATICA: ICD-10-CM

## 2024-03-02 PROCEDURE — 72114 X-RAY EXAM L-S SPINE BENDING: CPT | Performed by: RADIOLOGY

## 2024-03-02 PROCEDURE — 72114 X-RAY EXAM L-S SPINE BENDING: CPT

## 2024-03-04 DIAGNOSIS — E11.9 DIABETES MELLITUS TYPE 2 WITHOUT RETINOPATHY (MULTI): ICD-10-CM

## 2024-03-05 RX ORDER — ATORVASTATIN CALCIUM 40 MG/1
TABLET, FILM COATED ORAL
Qty: 30 TABLET | Refills: 3 | Status: SHIPPED | OUTPATIENT
Start: 2024-03-05 | End: 2024-05-17 | Stop reason: SDUPTHER

## 2024-03-05 RX ORDER — FENOFIBRATE 160 MG/1
160 TABLET ORAL DAILY
Qty: 30 TABLET | Refills: 3 | Status: SHIPPED | OUTPATIENT
Start: 2024-03-05 | End: 2024-05-17 | Stop reason: SDUPTHER

## 2024-03-10 ASSESSMENT — SLIT LAMP EXAM - LIDS
COMMENTS: GOOD POSITION
COMMENTS: GOOD POSITION

## 2024-03-10 ASSESSMENT — EXTERNAL EXAM - LEFT EYE: OS_EXAM: NORMAL

## 2024-03-10 ASSESSMENT — EXTERNAL EXAM - RIGHT EYE: OD_EXAM: NORMAL

## 2024-03-10 NOTE — PROGRESS NOTES
Diabetes  -OCT macula (3/11/24) - SS: 10/10 OD and 6/10 OS. Normal thickness and contour OU. Intact EZ OU. No edema OU. 252/247. Stable from 1/19/23.   -HbA1c= 8.0 (12/13/23). No diabetic retinopathy seen on exam. Continue close monitoring of blood glucose, blood pressure, and cholesterol. Plan for annual dilated eye exam.    Combined form of age-related cataract, right eyeH25.811  Combined form of age-related cataract, left eyeH25.812  -Not visually significant at this time. Monitor.     Floppy eyelids, bilateral  -Patient being evaluated for ERIK. Monitor.     Myopia  Astigmatism  -New Rx given per patient request.   -Possible mild amblyopia OU due to cyl?      Attending Attestation Statement for Evaluation and Management Services:    I was physically present and/or personally examined the patient during the evaluation of this patient. I reviewed the documentation and confirm the resident's note.

## 2024-03-11 ENCOUNTER — OFFICE VISIT (OUTPATIENT)
Dept: OPHTHALMOLOGY | Facility: CLINIC | Age: 29
End: 2024-03-11
Payer: COMMERCIAL

## 2024-03-11 DIAGNOSIS — H52.13 MYOPIA OF BOTH EYES: ICD-10-CM

## 2024-03-11 DIAGNOSIS — E11.9 DIABETES MELLITUS WITHOUT COMPLICATION (MULTI): Primary | ICD-10-CM

## 2024-03-11 DIAGNOSIS — H52.203 ASTIGMATISM OF BOTH EYES, UNSPECIFIED TYPE: ICD-10-CM

## 2024-03-11 PROCEDURE — 99213 OFFICE O/P EST LOW 20 MIN: CPT | Performed by: OPHTHALMOLOGY

## 2024-03-11 PROCEDURE — 92015 DETERMINE REFRACTIVE STATE: CPT | Performed by: OPHTHALMOLOGY

## 2024-03-11 ASSESSMENT — ENCOUNTER SYMPTOMS
CARDIOVASCULAR NEGATIVE: 0
HEMATOLOGIC/LYMPHATIC NEGATIVE: 0
ALLERGIC/IMMUNOLOGIC NEGATIVE: 0
ENDOCRINE NEGATIVE: 0
CONSTITUTIONAL NEGATIVE: 0
NEUROLOGICAL NEGATIVE: 0
MUSCULOSKELETAL NEGATIVE: 0
RESPIRATORY NEGATIVE: 0
EYES NEGATIVE: 1
GASTROINTESTINAL NEGATIVE: 0
PSYCHIATRIC NEGATIVE: 0

## 2024-03-11 ASSESSMENT — VISUAL ACUITY
METHOD: SNELLEN - LINEAR
OS_SC: 20/60
OD_SC: 20/60

## 2024-03-11 ASSESSMENT — REFRACTION_MANIFEST
OD_SPHERE: -1.25
OS_SPHERE: -1.75
OS_AXIS: 175
OD_AXIS: 160
OS_CYLINDER: -2.75
OD_SPHERE: -1.25
METHOD_AUTOREFRACTION: 1
OS_AXIS: 175
OD_AXIS: 160
OD_CYLINDER: -2.25
OS_SPHERE: -1.50
OD_CYLINDER: -2.25
OS_CYLINDER: -2.75

## 2024-03-11 ASSESSMENT — CUP TO DISC RATIO
OS_RATIO: 0.30
OD_RATIO: 0.30

## 2024-03-11 ASSESSMENT — REFRACTION_WEARINGRX
OS_SPHERE: LEFT IN CAR
OD_SPHERE: LEFT IN CAR

## 2024-03-11 ASSESSMENT — TONOMETRY
OS_IOP_MMHG: 14
IOP_METHOD: GOLDMANN APPLANATION
OD_IOP_MMHG: 14

## 2024-03-12 ENCOUNTER — TELEPHONE (OUTPATIENT)
Dept: DERMATOLOGY | Facility: CLINIC | Age: 29
End: 2024-03-12

## 2024-03-12 ENCOUNTER — CLINICAL SUPPORT (OUTPATIENT)
Dept: DERMATOLOGY | Facility: CLINIC | Age: 29
End: 2024-03-12
Payer: COMMERCIAL

## 2024-03-12 DIAGNOSIS — L40.0 PLAQUE PSORIASIS: ICD-10-CM

## 2024-03-12 NOTE — PROGRESS NOTES
Subjective     Cedric Lomeli is a 28 y.o. male who presents for the following: Nurse Visit (Injection Bimzelx 320 mg in total).     Review of Systems:  No other skin or systemic complaints other than what is documented elsewhere in the note.    The following portions of the chart were reviewed this encounter and updated as appropriate:          Skin Cancer History  No skin cancer on file.      Specialty Problems          Dermatology Problems    Psoriasis vulgaris    Acanthosis nigricans        Objective   Well appearing patient in no apparent distress; mood and affect are within normal limits.    A focused skin examination was performed. All findings within normal limits unless otherwise noted below.    Sample of Bimzelx 160mg/ml (2 injectors) for a total of 320mg was administered to the left abdominal area per pt preference.  WEEK 4 of initiation. Next due at 8 weeks (pt will schedule for 4 weeks from today).    NDC 73073-162-70  LOT [id number]  Exp: 2025-SEP-18    Assessment/Plan

## 2024-03-21 NOTE — PROGRESS NOTES
Patient coming in for follow up for T2DM    Yoon Neff Fe Lomeli is a 28 y.o. male who presents for follow up for Type 2 diabetes mellitus.   Lab Results   Component Value Date    HGBA1C 8.0 (H) 12/13/2023   Mr. Lomeli is a 28-year-old man with history of IDDM on U500, morbid obesity, NAFLD, psoriasis and hyperlipidemia coming in for follow-up.  date of diagnosis: age 11.Date of last HbA1c: Dec 2023 and results: 8%.   Last seen Jan 2023.. Lost his insurance for few months couldn't get his trulicity. BG were elevated  Following with dermatology for psoriasis  Was admitted to the hospital in January for RSV  Has been having issues with getting trulicity.  Cannot workout yet since he feels short of breath and oxygen drops to 70s  Had to be on oxygen   Sleeps with oxygen and has an apt with sleep medicine Apr 1st  Since the hospital has been only on trulicity and metformin  Went to the hospital 350 lbs and now 288  Now off insulin  Current DM Regimen:.  Was U500 36 units Before Breakfast, lunch and dinner.   Was on trulicity 3mg last taken this Monday and now has ozempic 1 mg at home.  Metformin 1000 mg BID sometimes takes it once   Lisinopril, Fenofibrate , Atorvastatin   BG in am: [phone]  Before lunch: -57-81-93-93  Before dinner: 07-92-96-88-93-87  Bed time: 117-94  Hasn't been eating much  Breakfast 10-11 Tortilla with 2 eggs with cheese  Snack at 3 Grapes or a meal casadilla   Dinner at 6 Whatever his mom cooks sweet potatoe and chicken breast  Not eating much rice at before.   Goes to bed at 12 and wakes up at 8  Diabetes Surveillance: Eye exam: Jan 2023 Foot exam/podiatrist: Jan 13, 2023 Scheduled for 2024  Opthalmic: Diabetes mellitus with both eyes affected by mild nonproliferative retinopathy without macular edema week ago  Cardiovascular: no coronary artery bypass graft and no coronary artery disease. Atorvastatin and fenofibrate. LDL: 94  Renal: nephropathy  UACR 2022 514, but no end stage renal disease .On Lisinopril lowered to 10 mg from 20 mg  Neurologic: no neuropathy.   No hx of UTI  No hx of DKA  With psoriasis given biologic    Review of Systems  all pertinent systems reviewed and are otherwise negative   Objective   Vitals:    03/22/24 0820   BP: 134/85   Pulse: 97   Temp: 35.7 °C (96.2 °F)   SpO2: 98%      Physical Exam  Constitutional:       General: He is not in acute distress.     Appearance: Normal appearance. He is obese.   Eyes:      Extraocular Movements: Extraocular movements intact.      Pupils: Pupils are equal, round, and reactive to light.   Cardiovascular:      Rate and Rhythm: Normal rate and regular rhythm.   Pulmonary:      Effort: Pulmonary effort is normal. No respiratory distress.      Breath sounds: Normal breath sounds.   Abdominal:      General: Bowel sounds are normal.      Palpations: Abdomen is soft.      Tenderness: There is no abdominal tenderness.   Skin:     Coloration: Skin is not jaundiced or pale.      Findings: No erythema or rash.   Neurological:      General: No focal deficit present.      Mental Status: He is alert and oriented to person, place, and time.      Deep Tendon Reflexes: Reflexes normal.   Psychiatric:         Mood and Affect: Mood normal.         Behavior: Behavior normal.         Lab Review  Glucose (mg/dL)   Date Value   01/24/2024 120 (H)   01/23/2024 106 (H)   01/22/2024 105 (H)     Hemoglobin A1C (%)   Date Value   12/13/2023 8.0 (H)   10/14/2022 6.3 (A)   06/24/2022 6.6 (A)   01/07/2022 8.5 (A)     Bicarbonate (mmol/L)   Date Value   01/24/2024 38 (H)   01/23/2024 36 (H)   01/22/2024 38 (H)     Urea Nitrogen (mg/dL)   Date Value   01/24/2024 12   01/23/2024 15   01/22/2024 20     Creatinine (mg/dL)   Date Value   01/24/2024 0.82   01/23/2024 0.84   01/22/2024 1.15     Lab Results   Component Value Date    CHOL 62 01/19/2024    CHOL 141 12/13/2023    CHOL 115 06/24/2022     Lab Results   Component Value Date  "   HDL 16.6 01/19/2024    HDL 25.9 12/13/2023    HDL 23.8 (A) 06/24/2022     Lab Results   Component Value Date    LDLCALC 32 01/19/2024    LDLCALC 94 12/13/2023     Lab Results   Component Value Date    TRIG 68 01/19/2024    TRIG 104 12/13/2023    TRIG 146 06/24/2022     No components found for: \"CHOLHDL\"   Lab Results   Component Value Date    TSH 3.86 12/13/2023       Assessment/Plan   Mr. Lomeli is a 28-year-old man with history of IDDM on U500, morbid obesity, NAFLD, psoriasis and hyperlipidemia coming in for follow-up.  date of diagnosis: age 11.Date of last HbA1c: Dec 2023 and results: 8%.   Last seen Jan 2023.. Lost his insurance for few months couldn't get his trulicity. BG were elevated  Following with dermatology for psoriasis  Was admitted to the hospital in January for RSV  Has been having issues with getting trulicity.  Cannot workout yet since he feels short of breath and oxygen drops to 70s  Had to be on oxygen   Sleeps with oxygen and has an apt with sleep medicine Apr 1st  Since the hospital has been only on trulicity and metformin  Went to the hospital 350 lbs and now 288  Now off insulin  Current DM Regimen:.  Was U500 36 units Before Breakfast, lunch and dinner. Not taking it  Was on trulicity 3mg last taken this Monday and now has ozempic 1 mg at home.  Metformin 1000 mg BID sometimes takes it once   Lisinopril, Fenofibrate , Atorvastatin   BG in am: [phone], Before lunch: -51-81-93-93, Before dinner: 56-72-38-88-93-87  Diabetes Surveillance: Eye exam: Jan 2023 Foot exam/podiatrist: Jan 13, 2023 Scheduled for 2024  Opthalmic: Diabetes mellitus with both eyes affected by mild nonproliferative retinopathy without macular edema week ago  Cardiovascular: no coronary artery bypass graft and no coronary artery disease. Atorvastatin and fenofibrate. LDL: 94  Renal: nephropathy UACR 2022 514, but no end stage renal disease .On Lisinopril lowered to 10 mg from 20 " mg  Neurologic: no neuropathy.     Plan:  Continue Metformin 1000 mg twice daily  Start Ozempic 1 mg once weekly  Continue to monitor sugars 3 times daily  In case BG increasing let us know we will consider starting Jardiance 25 mg daily  Continue to watch diet  Continue Atorvastatin and fenofibrate  Follow with ophthalmology, podiatry and sleep medicine    Blood work before next apt    RTC in August  Problem List Items Addressed This Visit       Dyslipidemia    Relevant Orders    Lipid Panel    Obesity     Other Visit Diagnoses       Diabetes mellitus type 2 without retinopathy (CMS/[facility])    -  Primary    Relevant Orders    Renal Function Panel    Lipid Panel    Hemoglobin A1C    Albumin , Urine Random    Tsh With Reflex To Free T4 If Abnormal

## 2024-03-22 ENCOUNTER — APPOINTMENT (OUTPATIENT)
Dept: SLEEP MEDICINE | Facility: CLINIC | Age: 29
End: 2024-03-22
Payer: COMMERCIAL

## 2024-03-22 ENCOUNTER — OFFICE VISIT (OUTPATIENT)
Dept: ENDOCRINOLOGY | Facility: HOSPITAL | Age: 29
End: 2024-03-22
Payer: COMMERCIAL

## 2024-03-22 VITALS
OXYGEN SATURATION: 98 % | SYSTOLIC BLOOD PRESSURE: 134 MMHG | BODY MASS INDEX: 46.28 KG/M2 | TEMPERATURE: 96.2 F | HEIGHT: 66 IN | DIASTOLIC BLOOD PRESSURE: 85 MMHG | HEART RATE: 97 BPM | WEIGHT: 288 LBS

## 2024-03-22 DIAGNOSIS — E78.5 DYSLIPIDEMIA: ICD-10-CM

## 2024-03-22 DIAGNOSIS — E11.9 DIABETES MELLITUS TYPE 2 WITHOUT RETINOPATHY (MULTI): Primary | ICD-10-CM

## 2024-03-22 DIAGNOSIS — E66.01 CLASS 3 SEVERE OBESITY DUE TO EXCESS CALORIES WITH SERIOUS COMORBIDITY AND BODY MASS INDEX (BMI) OF 50.0 TO 59.9 IN ADULT (MULTI): ICD-10-CM

## 2024-03-22 LAB — GLUCOSE BLD MANUAL STRIP-MCNC: 111 MG/DL (ref 74–99)

## 2024-03-22 PROCEDURE — 3008F BODY MASS INDEX DOCD: CPT | Performed by: STUDENT IN AN ORGANIZED HEALTH CARE EDUCATION/TRAINING PROGRAM

## 2024-03-22 PROCEDURE — 3079F DIAST BP 80-89 MM HG: CPT | Performed by: STUDENT IN AN ORGANIZED HEALTH CARE EDUCATION/TRAINING PROGRAM

## 2024-03-22 PROCEDURE — 82947 ASSAY GLUCOSE BLOOD QUANT: CPT | Performed by: STUDENT IN AN ORGANIZED HEALTH CARE EDUCATION/TRAINING PROGRAM

## 2024-03-22 PROCEDURE — 3060F POS MICROALBUMINURIA REV: CPT | Performed by: STUDENT IN AN ORGANIZED HEALTH CARE EDUCATION/TRAINING PROGRAM

## 2024-03-22 PROCEDURE — 3075F SYST BP GE 130 - 139MM HG: CPT | Performed by: STUDENT IN AN ORGANIZED HEALTH CARE EDUCATION/TRAINING PROGRAM

## 2024-03-22 PROCEDURE — 1036F TOBACCO NON-USER: CPT | Performed by: STUDENT IN AN ORGANIZED HEALTH CARE EDUCATION/TRAINING PROGRAM

## 2024-03-22 PROCEDURE — 99215 OFFICE O/P EST HI 40 MIN: CPT | Performed by: STUDENT IN AN ORGANIZED HEALTH CARE EDUCATION/TRAINING PROGRAM

## 2024-03-22 PROCEDURE — 36416 COLLJ CAPILLARY BLOOD SPEC: CPT | Performed by: STUDENT IN AN ORGANIZED HEALTH CARE EDUCATION/TRAINING PROGRAM

## 2024-03-22 PROCEDURE — 4010F ACE/ARB THERAPY RXD/TAKEN: CPT | Performed by: STUDENT IN AN ORGANIZED HEALTH CARE EDUCATION/TRAINING PROGRAM

## 2024-03-22 PROCEDURE — 3048F LDL-C <100 MG/DL: CPT | Performed by: STUDENT IN AN ORGANIZED HEALTH CARE EDUCATION/TRAINING PROGRAM

## 2024-03-22 ASSESSMENT — LIFESTYLE VARIABLES
AUDIT-C TOTAL SCORE: 0
SKIP TO QUESTIONS 9-10: 1
HOW MANY STANDARD DRINKS CONTAINING ALCOHOL DO YOU HAVE ON A TYPICAL DAY: PATIENT DOES NOT DRINK
HOW OFTEN DO YOU HAVE A DRINK CONTAINING ALCOHOL: NEVER
HOW OFTEN DO YOU HAVE SIX OR MORE DRINKS ON ONE OCCASION: NEVER

## 2024-03-22 ASSESSMENT — ENCOUNTER SYMPTOMS
OCCASIONAL FEELINGS OF UNSTEADINESS: 0
LOSS OF SENSATION IN FEET: 0
DEPRESSION: 0

## 2024-03-22 ASSESSMENT — PATIENT HEALTH QUESTIONNAIRE - PHQ9
2. FEELING DOWN, DEPRESSED OR HOPELESS: NOT AT ALL
SUM OF ALL RESPONSES TO PHQ9 QUESTIONS 1 & 2: 0
1. LITTLE INTEREST OR PLEASURE IN DOING THINGS: NOT AT ALL

## 2024-03-22 ASSESSMENT — PAIN SCALES - GENERAL: PAINLEVEL: 0-NO PAIN

## 2024-03-22 NOTE — PATIENT INSTRUCTIONS
Continue Metformin 1000 mg twice daily  Start Ozempic 1 mg once weekly  Continue to monitor sugars 3 times daily  In case you notice BG increasing let us know we ill consider starting Jardiance 25 mg daily  Continue to watch diet  Continue Atorvastatin and fenofibrate  Follow with ophthalmology, podiatry and sleep medicine    Blood work before next apt    RTC in August

## 2024-03-25 PROCEDURE — RXMED WILLOW AMBULATORY MEDICATION CHARGE

## 2024-03-29 PROBLEM — R09.02 HYPOXIA: Status: ACTIVE | Noted: 2024-03-29

## 2024-03-29 PROBLEM — M79.671 PAIN IN BOTH FEET: Status: ACTIVE | Noted: 2024-03-29

## 2024-03-29 PROBLEM — B35.1 ONYCHOMYCOSIS: Status: ACTIVE | Noted: 2024-03-29

## 2024-03-29 PROBLEM — M79.672 PAIN IN BOTH FEET: Status: ACTIVE | Noted: 2024-03-29

## 2024-03-29 PROBLEM — R06.89 HYPERCAPNIA: Status: ACTIVE | Noted: 2024-03-29

## 2024-03-29 PROBLEM — M54.16 LUMBAR RADICULOPATHY: Status: ACTIVE | Noted: 2024-03-29

## 2024-03-29 PROBLEM — E11.9 TYPE 2 DIABETES MELLITUS WITHOUT RETINOPATHY (MULTI): Status: ACTIVE | Noted: 2023-11-29

## 2024-04-01 ENCOUNTER — OFFICE VISIT (OUTPATIENT)
Dept: SLEEP MEDICINE | Facility: CLINIC | Age: 29
End: 2024-04-01
Payer: COMMERCIAL

## 2024-04-01 VITALS
RESPIRATION RATE: 18 BRPM | HEIGHT: 67 IN | DIASTOLIC BLOOD PRESSURE: 68 MMHG | HEART RATE: 96 BPM | OXYGEN SATURATION: 94 % | WEIGHT: 289.6 LBS | BODY MASS INDEX: 45.45 KG/M2 | SYSTOLIC BLOOD PRESSURE: 102 MMHG

## 2024-04-01 DIAGNOSIS — G47.33 OBSTRUCTIVE SLEEP APNEA: ICD-10-CM

## 2024-04-01 DIAGNOSIS — G47.30 SLEEP APNEA, UNSPECIFIED TYPE: Primary | ICD-10-CM

## 2024-04-01 DIAGNOSIS — Z99.81 ON HOME O2: ICD-10-CM

## 2024-04-01 PROCEDURE — 3048F LDL-C <100 MG/DL: CPT | Performed by: GENERAL PRACTICE

## 2024-04-01 PROCEDURE — 1036F TOBACCO NON-USER: CPT | Performed by: GENERAL PRACTICE

## 2024-04-01 PROCEDURE — 99214 OFFICE O/P EST MOD 30 MIN: CPT | Performed by: GENERAL PRACTICE

## 2024-04-01 PROCEDURE — 3060F POS MICROALBUMINURIA REV: CPT | Performed by: GENERAL PRACTICE

## 2024-04-01 PROCEDURE — 3008F BODY MASS INDEX DOCD: CPT | Performed by: GENERAL PRACTICE

## 2024-04-01 PROCEDURE — 3078F DIAST BP <80 MM HG: CPT | Performed by: GENERAL PRACTICE

## 2024-04-01 PROCEDURE — 3074F SYST BP LT 130 MM HG: CPT | Performed by: GENERAL PRACTICE

## 2024-04-01 PROCEDURE — 4010F ACE/ARB THERAPY RXD/TAKEN: CPT | Performed by: GENERAL PRACTICE

## 2024-04-01 ASSESSMENT — SLEEP AND FATIGUE QUESTIONNAIRES
HOW LIKELY ARE YOU TO NOD OFF OR FALL ASLEEP WHILE LYING DOWN TO REST IN THE AFTERNOON WHEN CIRCUMSTANCES PERMIT: WOULD NEVER DOZE
HOW LIKELY ARE YOU TO NOD OFF OR FALL ASLEEP WHILE SITTING AND TALKING TO SOMEONE: WOULD NEVER DOZE
HOW LIKELY ARE YOU TO NOD OFF OR FALL ASLEEP WHILE SITTING AND READING: WOULD NEVER DOZE
ESS-CHAD TOTAL SCORE: 0
SITING INACTIVE IN A PUBLIC PLACE LIKE A CLASS ROOM OR A MOVIE THEATER: WOULD NEVER DOZE
HOW LIKELY ARE YOU TO NOD OFF OR FALL ASLEEP WHEN YOU ARE A PASSENGER IN A CAR FOR AN HOUR WITHOUT A BREAK: WOULD NEVER DOZE
SLEEP_PROBLEM_INTERFERES_DAILY_ACTIVITIES: A LITTLE
HOW LIKELY ARE YOU TO NOD OFF OR FALL ASLEEP IN A CAR, WHILE STOPPED FOR A FEW MINUTES IN TRAFFIC: WOULD NEVER DOZE
HOW LIKELY ARE YOU TO NOD OFF OR FALL ASLEEP WHILE WATCHING TV: WOULD NEVER DOZE
SLEEP_PROBLEM_NOTICEABLE_TO_OTHERS: A LITTLE
HOW LIKELY ARE YOU TO NOD OFF OR FALL ASLEEP WHILE SITTING QUIETLY AFTER LUNCH WITHOUT ALCOHOL: WOULD NEVER DOZE
SATISFACTION_WITH_CURRENT_SLEEP_PATTERN: SATISFIED
WORRIED_DISTRESSED_DUE_TO_SLEEP: A LITTLE

## 2024-04-01 ASSESSMENT — PATIENT HEALTH QUESTIONNAIRE - PHQ9
SUM OF ALL RESPONSES TO PHQ9 QUESTIONS 1 AND 2: 0
2. FEELING DOWN, DEPRESSED OR HOPELESS: NOT AT ALL
1. LITTLE INTEREST OR PLEASURE IN DOING THINGS: NOT AT ALL

## 2024-04-01 ASSESSMENT — ENCOUNTER SYMPTOMS
OCCASIONAL FEELINGS OF UNSTEADINESS: 0
LOSS OF SENSATION IN FEET: 0
DEPRESSION: 0

## 2024-04-01 ASSESSMENT — COLUMBIA-SUICIDE SEVERITY RATING SCALE - C-SSRS
1. IN THE PAST MONTH, HAVE YOU WISHED YOU WERE DEAD OR WISHED YOU COULD GO TO SLEEP AND NOT WAKE UP?: NO
6. HAVE YOU EVER DONE ANYTHING, STARTED TO DO ANYTHING, OR PREPARED TO DO ANYTHING TO END YOUR LIFE?: NO
2. HAVE YOU ACTUALLY HAD ANY THOUGHTS OF KILLING YOURSELF?: NO

## 2024-04-01 NOTE — LETTER
April 1, 2024     Patient: Cedric Lomeli   YOB: 1995   Date of Visit: 4/1/2024       To Whom It May Concern:    It is my medical opinion that Cedric Lomeli was seen by Dr. Taylor today 4/1/24 may return to work on 4/2/24 .    If you have any questions or concerns, please don't hesitate to call.         Sincerely,        Oneida Taylor, DO    CC: No Recipients

## 2024-04-01 NOTE — PATIENT INSTRUCTIONS
[facility] Sleep Medicine   [facility]  [address] [facility] [address] [address]  [phone]       NAME: Cedric Lomeli   DATE: 4/1/2024     Your Sleep Provider Today: Oneida Taylor,   Your Primary Care Physician: Dc Helms DO   Your Referring Provider: Cecelia aPyne MD    DIAGNOSIS:   1. Sleep apnea, unspecified type  In-Center Sleep Study (Sleep Provider Only)      2. Obstructive sleep apnea  Referral to Adult Sleep Medicine      3. On home O2  In-Center Sleep Study (Sleep Provider Only)          Thank you for coming to the Sleep Medicine Clinic today! Your sleep medicine provider today was: Oneida Taylor DO Below is a summary of your treatment plan, other important information, and our contact numbers:      TREATMENT PLAN     Follow up after sleep study or sooner as needed.       IMPORTANT INFORMATION     Call 911 for medical emergencies.  Our offices are generally open from Monday-Friday, 9 am - 5 pm.  If you need to get in touch with me, you may either call me and my team(number is below) or you can use [employer].  If a referral for a test, for CPAP, or for another specialist was made, and you have not heard about scheduling this within a week, please call scheduling at [phone]).  If you are unable to make your appointment for clinic or an overnight study, kindly call the office at least 48 hours in advance to cancel and reschedule.  If you are on CPAP, please bring your device's card or the device to each clinic appointment.   There are no supporting services by either the sleep doctors or their staff on weekends and Holidays, or after 5 PM on weekdays.   If you have been asked to come to a sleep study, make sure you bring toiletries, a comfy pillow, and any nighttime medications that you may regularly take. Also be sure to eat dinner before you arrive. We generally do not provide meals.      PRESCRIPTIONS     We require 7 days advanced  notice for prescription refills. If we do not receive the request in this time, we cannot guarantee that your medication will be refilled in time.      IMPORTANT PHONE NUMBERS     Sleep Medicine Clinic Fax: [phone]  Appointments (for Pediatric Sleep Clinic): [phone] - option 1  Appointments (for Adult Sleep Clinic): [phone] - option 2  Appointments (For Sleep Studies): [phone] - option 3  Behavioral Sleep Medicine: [phone]  Sleep Surgery: [phone]  ENT (Otolaryngology): [phone]  Headache Clinic (Neurology): [phone]  Neurology: [phone]  Psychiatry: [phone]  Pulmonary Function Testing (PFT) Center: [phone]  Pulmonary Medicine: [phone]  [employer] (DME): [phone]  [employer] (DME): [phone]  [facility] (DME): [phone]      OUR ADULT SLEEP MEDICINE TEAM   Please do not hesitate to call the office or sleep nurse with any questions between appointments:    Adult Sleep Nurses (Thuy Rico, RN and Jing Alonso RN):  For clinical questions and refilling prescriptions: [phone]  Email sleep diaries and other documents at: [email or web address]    Adult Sleep Medicine Secretaries:  Amelia Brady (For Vladimir/Mtz/Krise/Strohl/Yeh/Pak):   P: [phone]  F: [phone]  Ayesha Mensah (For Vasquez/Guggenbiller): P: [phone]  Fax: [phone]  Dolores Cooper (For Jurcevic/Blank): P: [phone]  F: [phone]  Ida Bennett (For [place]): P: [phone]  F: [phone]  Alyssa Roblero (For Nuvia/Chris/Zakhary): P: [phone]  F: [phone] Marie Andrews (For Moses/Yoni): P: [phone]  F: [phone]     Adult Sleep Medicine Advanced Practice Providers:  Darius Prince ([place], [place])  Amy Perez ([facility])  Opal Pagan CNP ([facility])  Beverley Nicole, ANDREE ([place], [place], [place])  Va Rachel ([place],  "[place])  Analia Vallejo CNP ([place], [place])        OUR SLEEP TESTING LOCATIONS     Our team will contact you to schedule your sleep study, however, you can contact us as follow:  Main Phone Line (scheduling only): [phone] ([phone]), option 3  Adult and Pediatric Locations   [place] (6 years and older): [facility] - 4th floor ([address], [facility]) After hours line: [phone]  [facility] (Main campus: All ages): [facility], 6th floor. After hours line: [phone]   [place] (5 years and older; younger considered on case-by-case basis): [address]; [address], [address]. Scheduling  After hours line: [phone]   [place] (6 years and older): [address]; [address]; [address]   [place] (6 years and older): [address] [place], [address]  After hours line: [phone]   [place] (13 years and older) in [place]: [address], 2nd floor  After hours line: [phone]   [place] (13 year and older): [address]  After hours line: [phone]     Adult Only Locations:   [place] (18 years and older): [address] [facility], 2nd floor   [place] (18 years and older): [address] [facility]; 4th floor  After hours line: [phone]  [facility] (18 years and older) at [place]: [address] [facility]  After hours line: [phone]          CONTACTING YOUR SLEEP MEDICINE PROVIDER     Send a message directly to your provider through \"My Chart\", which is the email service through your  Records Account: [email or web address]   Call [phone] and leave a message. One of the administrative assistants will forward the message to your sleep medicine provider through \"My Chart\" and/or email.     Your sleep medicine provider for this visit was: Oneida Taylor DO       "

## 2024-04-01 NOTE — PROGRESS NOTES
Patient: Cedric Lomeli    [id number]  : 1995 -- AGE 28 y.o.    Provider: Oneida Taylor DO     [facility]   Service Date: 2024              [facility]  New Visit Note        HISTORY OF PRESENT ILLNESS     The patient's referring provider is: Cecelia Payne MD    HISTORY OF PRESENT ILLNESS   Cedric Lomeli is a 28 y.o. male who presents to a [facility] for a sleep medicine evaluation with concerns of Sleep Apnea (Used cpap machine but hard to use because it hard for him to breathing. Recently had RVS used the bipap machine in the hospital but also had trouble there but using O2 at night while sleeping. Had a sleep study back in  and diagnosed with sleep apnea. ).     The patient  has a past medical history of CHF (congestive heart failure) (CMS/[facility]), Diabetes (CMS/[facility]), Diabetic retinopathy (CMS/[facility]), Psoriasis, and RSV (respiratory syncytial virus infection)..    PAST SLEEP HISTORY    Patient states that he was tested for sleep apnea around  due to snoring/ part of a diabetes study?/ witnessed apnea. He was found to have sleep apnea and was stared on pap therapy but he was getting claustrophobic from the mask. He used pap therapy only for a few days.   In 2024, he was hospitalized due to RSV and was discharged with 2LMP O2 which he currently uses only at night.     CURRENT HISTORY    On today's visit, 2024, the patient reports that he would like to establish care for his sleep apnea.     Sleep schedule  on weekdays / work days:  Usual Bedtime: 12am  Sleep latency: few min  Wake time : 8:15am  Total sleep time average/day: 7 hours/day  Awakenings: 1x per week or less, unknown cause, short.   Naps: 12pm, 30min, 2x per week.       Sleep schedule  on weekends/non work days :  Varies    Sleep aids: none   Stimulants: none    Occupation:  from home.      Preferred sleeping position: SLEEP POSITION: supine, prone, and sidelying    Sleep-related ROS:    Snoring:  y  Witnessed apneas:   y     Gasping/ choking: n     Am Dry mouth:   n          Nasal congestion:   seasonal     am headaches: n    Sleep is described as refreshing.     Daytime sleepiness: sometimes   Fatigue or decreased energy: sometimes   Difficulty remembering things in daytime: n  Difficulty staying focused in daytime: n  Irritable during the day: n    Drowsy driving: n  Hx of car accident: n  Near-miss Car accident: n      RLS screen:  RLSSCREEN: - Sensations: Patient does not have unusual sensations in their extremities that cause an urge to move them     Sleep-related behaviors: DENIES    Sleep environment:  Bed partner :  n  Pets in bed :   n  Bedroom temperature: BEDROOM TEMP: cool  Noise :   TV on but muted; fan on.   Issues with bed comfort : y    Daytime Symptoms  ESS: 0         REVIEW OF SYSTEMS     REVIEW OF SYSTEMS  Review of Systems   All other systems reviewed and are negative.      ALLERGIES AND MEDICATIONS     ALLERGIES  Allergies   Allergen Reactions    Aspirin Hives and Itching       MEDICATIONS  Current Outpatient Medications   Medication Sig Dispense Refill    atorvastatin (Lipitor) 40 mg tablet Take one tablet daily as directed 30 tablet 3    bimekizumab-bkzx (Bimzelx Autoinjector) 160 mg/mL auto-injector Inject 320 mg under the skin see administration instructions. Inject 320mg subcutaneously at week 0, 4, 8, 12, and 16. Loading dose. 1 mL 5    bimekizumab-bkzx (Bimzelx Autoinjector) 160 mg/mL auto-injector Inject 320 mg under the skin every 8 (eight) weeks. Maintenance dose. 1 mL 6    fenofibrate (Triglide) 160 mg tablet Take 1 tablet (160 mg) by mouth once daily. 30 tablet 3    HumuLIN R U-500, Conc, Insulin 500 unit/mL CONCENTRATED injection Inject 36 units 3 times  daily as directed (Patient taking differently: Inject 0.04 mL (20 Units) under the skin once daily at bedtime.  "Inject 36 units 3 times  daily as directed) 50 mL 3    insulin syringe-needle U-100 30G X 1/2\" 0.5 mL syringe Use 3 a day with insulin .      lisinopril 10 mg tablet Take 1 tablet (10 mg) by mouth once daily. 90 tablet 3    metFORMIN (Glucophage) 1,000 mg tablet Take 1 tablet (1,000 mg) by mouth once daily in the evening. Take with meals.      semaglutide (Ozempic) 1 mg/dose (4 mg/3 mL) pen injector Inject 1 mg under the skin 1 (one) time per week. 3 mL 3     No current facility-administered medications for this visit.         PAST HISTORY     PAST MEDICAL HISTORY  He  has a past medical history of CHF (congestive heart failure) (CMS/HCC), Diabetes (CMS/HCC), Diabetic retinopathy (CMS/HCC), Psoriasis, and RSV (respiratory syncytial virus infection).      PAST SURGICAL HISTORY:  No past surgical history on file.    FAMILY HISTORY  Family History   Problem Relation Name Age of Onset    Other (high blood pressure) Mother      Other (high blood pressure) Father       DOES/DOES NOT EC: does not have a family history of sleep disorder.      SOCIAL HISTORY  He  reports that he has never smoked. He has never used smokeless tobacco. He reports that he does not drink alcohol and does not use drugs. He currently lives with parents.     Caffeine consumption: n        PHYSICAL EXAM     VITAL SIGNS: /68   Pulse 96   Resp 18   Ht 1.702 m (5' 7\")   Wt 131 kg (289 lb 9.6 oz)   SpO2 94%   BMI 45.36 kg/m²      PREVIOUS WEIGHTS:  Wt Readings from Last 3 Encounters:   04/01/24 131 kg (289 lb 9.6 oz)   03/22/24 131 kg (288 lb)   02/08/24 134 kg (295 lb)       Physical Exam  Constitutional: Alert and oriented, cooperative, no obvious distress.   HENT: normocephalic.   Eyes: PERRLA, nonicteric   Neck: Supple, trachea midline   respiratory: CTA bilaterally, no wheezing/ crackles/ cough  Cardiac: no rub/ gallops  GI:BS in all 4 quadrants, Soft, nontender, no masses  musculoskeletal/ Extremities: No clubbing  integumentary: no " significant rashes observed.   Neurologic: AOx3.   psychiatric: appropriate mood and affect.  Modified Mallampati: 4      RESULTS/DATA       ASSESSMENT/PLAN     Mr. Lomeli is a 28 y.o. male and  has a past medical history of CHF (congestive heart failure) (CMS/[facility]), Diabetes (CMS/[facility]), Diabetic retinopathy (CMS/[facility]), Psoriasis, and RSV (respiratory syncytial virus infection). He was referred to the [facility] for evaluation of sleep apnea.     Problem List Items Addressed This Visit       Obstructive sleep apnea       Problem List and Orders  Pmhx includes diabetes, sleep apnea?, HTN, NAFLD, currently on 2LMP only at night due to RSV, heart failure? 2/2 RSV?.     1- hx of sleep apnea  No records of sleep study, done around 2008 at ?   Stopped pap therapy after a few days of use due to claustrophobia?   -unsure if still has his old machine.     Interested in restarting treatment for sleep apnea; may use old pap machine if he finds it.      -cont. 2LPM O2 pending sleep study results.   -ordered sleep study in lab to start with no O2 but O2 to be added per protocol if needed.   -bicarb 1/24/24 --> 38    -do not drive or operate heavy machinery if drowsy.  -avoid sleeping on your back.   -avoid sedating substances/ medication, alcohol, illicit drugs and tobacco.    2- Obesity  counseled on eating a healthy diet and exercising as tolerated.  Working on his own to lose weight.     Follow up after sleep study or sooner as needed.

## 2024-04-04 ENCOUNTER — PHARMACY VISIT (OUTPATIENT)
Dept: PHARMACY | Facility: CLINIC | Age: 29
End: 2024-04-04
Payer: COMMERCIAL

## 2024-04-05 ENCOUNTER — OFFICE VISIT (OUTPATIENT)
Dept: CARDIOLOGY | Facility: CLINIC | Age: 29
End: 2024-04-05
Payer: COMMERCIAL

## 2024-04-05 VITALS
SYSTOLIC BLOOD PRESSURE: 122 MMHG | WEIGHT: 291.8 LBS | HEIGHT: 67 IN | OXYGEN SATURATION: 98 % | BODY MASS INDEX: 45.8 KG/M2 | DIASTOLIC BLOOD PRESSURE: 78 MMHG | HEART RATE: 91 BPM

## 2024-04-05 DIAGNOSIS — I10 PRIMARY HYPERTENSION: ICD-10-CM

## 2024-04-05 DIAGNOSIS — E78.5 DYSLIPIDEMIA: Primary | ICD-10-CM

## 2024-04-05 DIAGNOSIS — Z86.79 HISTORY OF ACUTE HEART FAILURE: ICD-10-CM

## 2024-04-05 PROCEDURE — 4010F ACE/ARB THERAPY RXD/TAKEN: CPT | Performed by: INTERNAL MEDICINE

## 2024-04-05 PROCEDURE — 99214 OFFICE O/P EST MOD 30 MIN: CPT | Performed by: INTERNAL MEDICINE

## 2024-04-05 PROCEDURE — 3078F DIAST BP <80 MM HG: CPT | Performed by: INTERNAL MEDICINE

## 2024-04-05 PROCEDURE — 3008F BODY MASS INDEX DOCD: CPT | Performed by: INTERNAL MEDICINE

## 2024-04-05 PROCEDURE — 3060F POS MICROALBUMINURIA REV: CPT | Performed by: INTERNAL MEDICINE

## 2024-04-05 PROCEDURE — 3074F SYST BP LT 130 MM HG: CPT | Performed by: INTERNAL MEDICINE

## 2024-04-05 PROCEDURE — 3048F LDL-C <100 MG/DL: CPT | Performed by: INTERNAL MEDICINE

## 2024-04-05 NOTE — LETTER
April 5, 2024     Dc Helms DO  [address]  [facility]  [facility] ([place], [place]) [address]    Patient: Cedric Lomeli   YOB: 1995   Date of Visit: 4/5/2024       Dear Dr. Dc Helms DO:    Thank you for referring Cedric Lomeli to me for evaluation. Below are my notes for this consultation.  If you have questions, please do not hesitate to call me. I look forward to following your patient along with you.       Sincerely,     Alfredo Fam MD      CC: No Recipients  ______________________________________________________________________________________        [facility] Cardiology Outpatient Follow-up Visit     Reason for Visit: CHF    HPI: Cedric Lomeli is a 28 y.o.  male who presents today for followup.     Patient is a 28-year-old male with a history of morbid obesity, diabetes, ERIK, hyperlipidemia who initially presented in January 2024 with shortness of breath and cough.  He was found to be hypoxic, tachycardic and tachypneic.  He developed respiratory failure requiring BiPAP.  He was found to have RSV infection/pneumonia.  He presents today for follow-up.  He denies any chest discomfort, shortness of breath, palpitations, lightheadedness, syncope, orthopnea, PND, lower extremity edema.    1/22/2024 echo: Ejection fraction 50%, poorly visualized anatomical structures.    Past Medical History:   He has a past medical history of CHF (congestive heart failure) (CMS/HCC), Diabetes (CMS/HCC), Diabetic retinopathy (CMS/HCC), Psoriasis, and RSV (respiratory syncytial virus infection).    Surgical History:   He has no past surgical history on file.    Family History:   Family History   Problem Relation Name Age of Onset   • Other (high blood pressure) Mother     • Other (high blood pressure) Father         Allergies:  Aspirin     Social History:   He reports that he has never smoked. He has never used smokeless tobacco. He reports that he  "does not drink alcohol and does not use drugs.     Prior Cardiovascular Testing (Personally Reviewed):     Review of Systems:  Review of Systems   All other systems reviewed and are negative.      Outpatient Medications:    Current Outpatient Medications:   •  atorvastatin (Lipitor) 40 mg tablet, Take one tablet daily as directed, Disp: 30 tablet, Rfl: 3  •  bimekizumab-bkzx (Bimzelx Autoinjector) 160 mg/mL auto-injector, Inject 320 mg under the skin see administration instructions. Inject 320mg subcutaneously at week 0, 4, 8, 12, and 16. Loading dose., Disp: 1 mL, Rfl: 5  •  bimekizumab-bkzx (Bimzelx Autoinjector) 160 mg/mL auto-injector, Inject 320 mg under the skin every 8 (eight) weeks. Maintenance dose., Disp: 1 mL, Rfl: 6  •  fenofibrate (Triglide) 160 mg tablet, Take 1 tablet (160 mg) by mouth once daily., Disp: 30 tablet, Rfl: 3  •  HumuLIN R U-500, Conc, Insulin 500 unit/mL CONCENTRATED injection, Inject 36 units 3 times  daily as directed (Patient taking differently: Inject 0.04 mL (20 Units) under the skin once daily at bedtime. Inject 36 units 3 times  daily as directed), Disp: 50 mL, Rfl: 3  •  insulin syringe-needle U-100 30G X 1/2\" 0.5 mL syringe, Use 3 a day with insulin ., Disp: , Rfl:   •  lisinopril 10 mg tablet, Take 1 tablet (10 mg) by mouth once daily., Disp: 90 tablet, Rfl: 3  •  metFORMIN (Glucophage) 1,000 mg tablet, Take 1 tablet (1,000 mg) by mouth once daily in the evening. Take with meals., Disp: , Rfl:   •  semaglutide (Ozempic) 1 mg/dose (4 mg/3 mL) pen injector, Inject 1 mg under the skin 1 (one) time per week., Disp: 3 mL, Rfl: 3     Last Recorded Vitals  There were no vitals taken for this visit.    Physical Exam:    Physical Exam  Vitals reviewed.   Constitutional:       Appearance: Normal appearance.   HENT:      Head: Normocephalic and atraumatic.      Mouth/Throat:      Mouth: Mucous membranes are moist.      Pharynx: Oropharynx is clear.   Eyes:      Extraocular Movements: " "Extraocular movements intact.      Conjunctiva/sclera: Conjunctivae normal.   Cardiovascular:      Rate and Rhythm: Normal rate and regular rhythm.      Pulses: Normal pulses.      Heart sounds: Normal heart sounds.   Pulmonary:      Effort: Pulmonary effort is normal.      Breath sounds: Normal breath sounds.   Abdominal:      General: Bowel sounds are normal.      Palpations: Abdomen is soft.   Musculoskeletal:      Cervical back: Neck supple.   Skin:     General: Skin is warm and dry.   Neurological:      General: No focal deficit present.      Mental Status: He is alert.   Psychiatric:         Mood and Affect: Mood normal.         Behavior: Behavior normal.         Lab/Radiology/Diagnostic Review:    Labs    Lab Results   Component Value Date    GLUCOSE 120 (H) 01/24/2024    CALCIUM 8.5 (L) 01/24/2024     (L) 01/24/2024    K 4.6 01/24/2024    CO2 38 (H) 01/24/2024    CL 93 (L) 01/24/2024    BUN 12 01/24/2024    CREATININE 0.82 01/24/2024       Lab Results   Component Value Date    WBC 7.5 01/24/2024    HGB 13.7 01/24/2024    HCT 48.5 01/24/2024    MCV 79 (L) 01/24/2024     01/24/2024       Lab Results   Component Value Date    CHOL 62 01/19/2024    CHOL 141 12/13/2023    CHOL 115 06/24/2022     Lab Results   Component Value Date    HDL 16.6 01/19/2024    HDL 25.9 12/13/2023    HDL 23.8 (A) 06/24/2022     Lab Results   Component Value Date    LDLCALC 32 01/19/2024    LDLCALC 94 12/13/2023     Lab Results   Component Value Date    TRIG 68 01/19/2024    TRIG 104 12/13/2023    TRIG 146 06/24/2022     No components found for: \"CHOLHDL\"    Lab Results   Component Value Date     (H) 01/19/2024       Lab Results   Component Value Date    TSH 3.86 12/13/2023       Assessment:   Asymptomatic from a cardiac standpoint.    Remains on statin therapy for dyslipidemia.  Remains on lisinopril for hypertension/diabetes.    Patient can follow-up with me in 2 years or sooner if he has more problems.    Alfredo DEAL" MD [place]

## 2024-04-05 NOTE — PROGRESS NOTES
[facility] Cardiology Outpatient Follow-up Visit     Reason for Visit: CHF    HPI: Cedric Lomeli is a 28 y.o.  male who presents today for followup.     Patient is a 28-year-old male with a history of morbid obesity, diabetes, ERIK, hyperlipidemia who initially presented in January 2024 with shortness of breath and cough.  He was found to be hypoxic, tachycardic and tachypneic.  He developed respiratory failure requiring BiPAP.  He was found to have RSV infection/pneumonia.  He presents today for follow-up.  He denies any chest discomfort, shortness of breath, palpitations, lightheadedness, syncope, orthopnea, PND, lower extremity edema.    1/22/2024 echo: Ejection fraction 50%, poorly visualized anatomical structures.    Past Medical History:   He has a past medical history of CHF (congestive heart failure) (CMS/HCC), Diabetes (CMS/HCC), Diabetic retinopathy (CMS/HCC), Psoriasis, and RSV (respiratory syncytial virus infection).    Surgical History:   He has no past surgical history on file.    Family History:   Family History   Problem Relation Name Age of Onset    Other (high blood pressure) Mother      Other (high blood pressure) Father         Allergies:  Aspirin     Social History:   He reports that he has never smoked. He has never used smokeless tobacco. He reports that he does not drink alcohol and does not use drugs.     Prior Cardiovascular Testing (Personally Reviewed):     Review of Systems:  Review of Systems   All other systems reviewed and are negative.      Outpatient Medications:    Current Outpatient Medications:     atorvastatin (Lipitor) 40 mg tablet, Take one tablet daily as directed, Disp: 30 tablet, Rfl: 3    bimekizumab-bkzx (Bimzelx Autoinjector) 160 mg/mL auto-injector, Inject 320 mg under the skin see administration instructions. Inject 320mg subcutaneously at week 0, 4, 8, 12, and 16. Loading dose., Disp: 1 mL, Rfl: 5    bimekizumab-bkzx (Bimzelx Autoinjector) 160 mg/mL  "auto-injector, Inject 320 mg under the skin every 8 (eight) weeks. Maintenance dose., Disp: 1 mL, Rfl: 6    fenofibrate (Triglide) 160 mg tablet, Take 1 tablet (160 mg) by mouth once daily., Disp: 30 tablet, Rfl: 3    HumuLIN R U-500, Conc, Insulin 500 unit/mL CONCENTRATED injection, Inject 36 units 3 times  daily as directed (Patient taking differently: Inject 0.04 mL (20 Units) under the skin once daily at bedtime. Inject 36 units 3 times  daily as directed), Disp: 50 mL, Rfl: 3    insulin syringe-needle U-100 30G X 1/2\" 0.5 mL syringe, Use 3 a day with insulin ., Disp: , Rfl:     lisinopril 10 mg tablet, Take 1 tablet (10 mg) by mouth once daily., Disp: 90 tablet, Rfl: 3    metFORMIN (Glucophage) 1,000 mg tablet, Take 1 tablet (1,000 mg) by mouth once daily in the evening. Take with meals., Disp: , Rfl:     semaglutide (Ozempic) 1 mg/dose (4 mg/3 mL) pen injector, Inject 1 mg under the skin 1 (one) time per week., Disp: 3 mL, Rfl: 3     Last Recorded Vitals  There were no vitals taken for this visit.    Physical Exam:    Physical Exam  Vitals reviewed.   Constitutional:       Appearance: Normal appearance.   HENT:      Head: Normocephalic and atraumatic.      Mouth/Throat:      Mouth: Mucous membranes are moist.      Pharynx: Oropharynx is clear.   Eyes:      Extraocular Movements: Extraocular movements intact.      Conjunctiva/sclera: Conjunctivae normal.   Cardiovascular:      Rate and Rhythm: Normal rate and regular rhythm.      Pulses: Normal pulses.      Heart sounds: Normal heart sounds.   Pulmonary:      Effort: Pulmonary effort is normal.      Breath sounds: Normal breath sounds.   Abdominal:      General: Bowel sounds are normal.      Palpations: Abdomen is soft.   Musculoskeletal:      Cervical back: Neck supple.   Skin:     General: Skin is warm and dry.   Neurological:      General: No focal deficit present.      Mental Status: He is alert.   Psychiatric:         Mood and Affect: Mood normal.         " "Behavior: Behavior normal.         Lab/Radiology/Diagnostic Review:    Labs    Lab Results   Component Value Date    GLUCOSE 120 (H) 01/24/2024    CALCIUM 8.5 (L) 01/24/2024     (L) 01/24/2024    K 4.6 01/24/2024    CO2 38 (H) 01/24/2024    CL 93 (L) 01/24/2024    BUN 12 01/24/2024    CREATININE 0.82 01/24/2024       Lab Results   Component Value Date    WBC 7.5 01/24/2024    HGB 13.7 01/24/2024    HCT 48.5 01/24/2024    MCV 79 (L) 01/24/2024     01/24/2024       Lab Results   Component Value Date    CHOL 62 01/19/2024    CHOL 141 12/13/2023    CHOL 115 06/24/2022     Lab Results   Component Value Date    HDL 16.6 01/19/2024    HDL 25.9 12/13/2023    HDL 23.8 (A) 06/24/2022     Lab Results   Component Value Date    LDLCALC 32 01/19/2024    LDLCALC 94 12/13/2023     Lab Results   Component Value Date    TRIG 68 01/19/2024    TRIG 104 12/13/2023    TRIG 146 06/24/2022     No components found for: \"CHOLHDL\"    Lab Results   Component Value Date     (H) 01/19/2024       Lab Results   Component Value Date    TSH 3.86 12/13/2023       Assessment:   Asymptomatic from a cardiac standpoint.    Remains on statin therapy for dyslipidemia.  Remains on lisinopril for hypertension/diabetes.    Patient can follow-up with me in 2 years or sooner if he has more problems.    Alfredo Fam MD      "

## 2024-04-05 NOTE — LETTER
April 5, 2024     Patient: Cedric Lomeli   YOB: 1995   Date of Visit: 4/5/2024       To Whom It May Concern:    Cedric Lomeli was seen in my clinic on 4/5/2024 at 1:00 pm. Please excuse Cedric for his absence from work on this day to make the appointment.    If you have any questions or concerns, please don't hesitate to call the office at [phone].         Sincerely,         Alfredo Fam MD        CC: No Recipients

## 2024-04-11 ENCOUNTER — CLINICAL SUPPORT (OUTPATIENT)
Dept: DERMATOLOGY | Facility: CLINIC | Age: 29
End: 2024-04-11
Payer: COMMERCIAL

## 2024-04-11 DIAGNOSIS — L40.0 PSORIASIS VULGARIS: ICD-10-CM

## 2024-04-12 DIAGNOSIS — E11.9 TYPE 2 DIABETES MELLITUS WITHOUT COMPLICATION, UNSPECIFIED WHETHER LONG TERM INSULIN USE (MULTI): ICD-10-CM

## 2024-04-12 RX ORDER — FLASH GLUCOSE SENSOR
KIT MISCELLANEOUS
Qty: 2 EACH | Refills: 11 | Status: SHIPPED | OUTPATIENT
Start: 2024-04-12

## 2024-04-18 ENCOUNTER — APPOINTMENT (OUTPATIENT)
Dept: NEUROSURGERY | Facility: CLINIC | Age: 29
End: 2024-04-18
Payer: COMMERCIAL

## 2024-05-01 ENCOUNTER — APPOINTMENT (OUTPATIENT)
Dept: PRIMARY CARE | Facility: CLINIC | Age: 29
End: 2024-05-01
Payer: COMMERCIAL

## 2024-05-03 ENCOUNTER — APPOINTMENT (OUTPATIENT)
Dept: NEUROSURGERY | Facility: CLINIC | Age: 29
End: 2024-05-03
Payer: COMMERCIAL

## 2024-05-06 PROCEDURE — RXMED WILLOW AMBULATORY MEDICATION CHARGE

## 2024-05-07 ENCOUNTER — PHARMACY VISIT (OUTPATIENT)
Dept: PHARMACY | Facility: CLINIC | Age: 29
End: 2024-05-07
Payer: COMMERCIAL

## 2024-05-08 DIAGNOSIS — L40.0 PLAQUE PSORIASIS: Primary | ICD-10-CM

## 2024-05-08 DIAGNOSIS — L40.0 PSORIASIS VULGARIS: Primary | ICD-10-CM

## 2024-05-08 RX ORDER — BIMEKIZUMAB 160 MG/ML
320 INJECTION, SOLUTION SUBCUTANEOUS
Qty: 2 ML | Refills: 11 | Status: SHIPPED | OUTPATIENT
Start: 2024-05-08

## 2024-05-16 DIAGNOSIS — E11.9 TYPE 2 DIABETES MELLITUS WITHOUT COMPLICATION, WITH LONG-TERM CURRENT USE OF INSULIN (MULTI): ICD-10-CM

## 2024-05-16 DIAGNOSIS — Z79.4 TYPE 2 DIABETES MELLITUS WITHOUT COMPLICATION, WITH LONG-TERM CURRENT USE OF INSULIN (MULTI): ICD-10-CM

## 2024-05-16 RX ORDER — INSULIN HUMAN 500 [IU]/ML
INJECTION, SOLUTION SUBCUTANEOUS
Qty: 50 ML | Refills: 2 | OUTPATIENT
Start: 2024-05-16

## 2024-05-16 RX ORDER — INSULIN HUMAN 500 [IU]/ML
INJECTION, SOLUTION SUBCUTANEOUS
Qty: 30 ML | Refills: 0 | Status: SHIPPED | OUTPATIENT
Start: 2024-05-16 | End: 2024-05-30 | Stop reason: SDUPTHER

## 2024-05-17 ENCOUNTER — OFFICE VISIT (OUTPATIENT)
Dept: PRIMARY CARE | Facility: CLINIC | Age: 29
End: 2024-05-17
Payer: COMMERCIAL

## 2024-05-17 VITALS
DIASTOLIC BLOOD PRESSURE: 79 MMHG | BODY MASS INDEX: 44.73 KG/M2 | HEIGHT: 67 IN | OXYGEN SATURATION: 97 % | WEIGHT: 285 LBS | HEART RATE: 94 BPM | SYSTOLIC BLOOD PRESSURE: 127 MMHG

## 2024-05-17 DIAGNOSIS — L40.0 PSORIASIS VULGARIS: ICD-10-CM

## 2024-05-17 DIAGNOSIS — E11.9 DIABETES MELLITUS TYPE 2 WITHOUT RETINOPATHY (MULTI): ICD-10-CM

## 2024-05-17 DIAGNOSIS — E66.01 CLASS 3 SEVERE OBESITY DUE TO EXCESS CALORIES WITH SERIOUS COMORBIDITY AND BODY MASS INDEX (BMI) OF 50.0 TO 59.9 IN ADULT (MULTI): ICD-10-CM

## 2024-05-17 DIAGNOSIS — G47.33 OBSTRUCTIVE SLEEP APNEA: Primary | ICD-10-CM

## 2024-05-17 DIAGNOSIS — E78.5 DYSLIPIDEMIA: ICD-10-CM

## 2024-05-17 PROCEDURE — 3060F POS MICROALBUMINURIA REV: CPT | Performed by: STUDENT IN AN ORGANIZED HEALTH CARE EDUCATION/TRAINING PROGRAM

## 2024-05-17 PROCEDURE — 99214 OFFICE O/P EST MOD 30 MIN: CPT | Performed by: STUDENT IN AN ORGANIZED HEALTH CARE EDUCATION/TRAINING PROGRAM

## 2024-05-17 PROCEDURE — 3008F BODY MASS INDEX DOCD: CPT | Performed by: STUDENT IN AN ORGANIZED HEALTH CARE EDUCATION/TRAINING PROGRAM

## 2024-05-17 PROCEDURE — 3074F SYST BP LT 130 MM HG: CPT | Performed by: STUDENT IN AN ORGANIZED HEALTH CARE EDUCATION/TRAINING PROGRAM

## 2024-05-17 PROCEDURE — 3078F DIAST BP <80 MM HG: CPT | Performed by: STUDENT IN AN ORGANIZED HEALTH CARE EDUCATION/TRAINING PROGRAM

## 2024-05-17 PROCEDURE — 1036F TOBACCO NON-USER: CPT | Performed by: STUDENT IN AN ORGANIZED HEALTH CARE EDUCATION/TRAINING PROGRAM

## 2024-05-17 PROCEDURE — 3048F LDL-C <100 MG/DL: CPT | Performed by: STUDENT IN AN ORGANIZED HEALTH CARE EDUCATION/TRAINING PROGRAM

## 2024-05-17 PROCEDURE — 4010F ACE/ARB THERAPY RXD/TAKEN: CPT | Performed by: STUDENT IN AN ORGANIZED HEALTH CARE EDUCATION/TRAINING PROGRAM

## 2024-05-17 RX ORDER — SEMAGLUTIDE 1.34 MG/ML
1 INJECTION, SOLUTION SUBCUTANEOUS
Qty: 3 ML | Refills: 3 | Status: CANCELLED | OUTPATIENT
Start: 2024-05-19

## 2024-05-17 RX ORDER — FENOFIBRATE 160 MG/1
160 TABLET ORAL DAILY
Qty: 30 TABLET | Refills: 3 | Status: SHIPPED | OUTPATIENT
Start: 2024-05-17

## 2024-05-17 RX ORDER — SEMAGLUTIDE 1.34 MG/ML
1 INJECTION, SOLUTION SUBCUTANEOUS
Qty: 3 ML | Refills: 3 | Status: SHIPPED | OUTPATIENT
Start: 2024-05-19

## 2024-05-17 RX ORDER — ATORVASTATIN CALCIUM 40 MG/1
TABLET, FILM COATED ORAL
Qty: 30 TABLET | Refills: 3 | Status: SHIPPED | OUTPATIENT
Start: 2024-05-17

## 2024-05-17 RX ORDER — METFORMIN HYDROCHLORIDE 1000 MG/1
1000 TABLET ORAL
Qty: 60 TABLET | Refills: 2 | Status: SHIPPED | OUTPATIENT
Start: 2024-05-17

## 2024-05-17 NOTE — PATIENT INSTRUCTIONS
Travel Clinic Locations  [facility]  [address] [place], [place] [id number] [phone]    [facility]  [address] [place] [address] [phone]

## 2024-05-17 NOTE — PROGRESS NOTES
Subjective   Patient ID: Cedric Lomeli is a 29 y.o. male who presents for Follow-up, Immunizations (Going out of the country to the [facility] next month and will need immunizations), and letter stating he needs o2 at night for thetrip.  HPI  Jerel is here for follow up visit.    He is traveling to the [facility] in [place], [place]. He is unsure of what vaccinations he needs prior to travel. He was born in the [facility], he has traveled there in the past as well. He needs a letter to bring to the airport regarding his nocturnal oxygen use. Additionally, needs refills on his medications. He has not been needing to use any insulin recently because he has had tight glycemic control recently. He has lost an additional 10 pounds since our last visit.    Review of Systems  12-point ROS was reviewed and is negative, unless otherwise noted in HPI    Objective   Vitals:    05/17/24 0901   BP: 127/79   Pulse: 94   SpO2: 97%      Physical Exam  GEN: alert, conversant, NAD  HEENT: PERRL, EOMI, MMM, Tms pearly gray bilaterally  NECK: supple, no LAD appreciated  CHEST: CTAB  CV: S1, S2, RRR, no murmurs appreciated  ABD: soft, NT, ND, obese  EXT: no significant LE edema  SKIN: psoriasis much improved    Assessment/Plan   #upcoming travel to the [facility]  - referral to  travel medicine clinics    #Obesity, Class III  #ERIK/OHS  - Counseled continued efforts on lifestyle modification including weight loss, diet, and increased exercise for >5 minutes  - Following up with Sleep Medicine as scheduled  - Will benefit from likely home BiPAP when able to get this arranged    # HTN  #HFpEF  Recent low BP readings consistently  systolic  - Reduce Lisinopril to 10mg daily  Discussed DASH diet and dietary sodium restrictions.   Increase dietary efforts and physical activity.   - Follow up with Cardiology as scheduled     # Diabetes Mellitus type 2  He has not been using insulin since getting out of the  hospital  Did have a hypoglycemic episode   Follows with Endocrinology and has a follow up appointment with Endo in March   Continue current management: Trulicity 1.5mg weekly and Metformin 1000mg BID  Lipid panel, microalbumin checked less than a year ago.  Increase dietary efforts and physical activity.  Routine diabetic retinopathy screening, foot exam/monofilament testing screening: up-to-date.    #psoriasis  Following with dermatology, doing well on biologic therapy    Health Maintenance:  Vaccines: COVID (x3), Flu, TDAP (2/2024), pneumonia (2013)  Screening: N/A  Labs: Reviewed recent, none needed today     RTC in 6 months, or sooner PRN    Dc Helms, DO

## 2024-05-30 DIAGNOSIS — E11.9 TYPE 2 DIABETES MELLITUS WITHOUT COMPLICATION, WITH LONG-TERM CURRENT USE OF INSULIN (MULTI): ICD-10-CM

## 2024-05-30 DIAGNOSIS — Z79.4 TYPE 2 DIABETES MELLITUS WITHOUT COMPLICATION, WITH LONG-TERM CURRENT USE OF INSULIN (MULTI): ICD-10-CM

## 2024-05-30 RX ORDER — INSULIN HUMAN 500 [IU]/ML
INJECTION, SOLUTION SUBCUTANEOUS
Qty: 30 ML | Refills: 1 | OUTPATIENT
Start: 2024-05-30

## 2024-05-30 RX ORDER — INSULIN HUMAN 500 [IU]/ML
INJECTION, SOLUTION SUBCUTANEOUS
Qty: 10 ML | Refills: 11 | Status: SHIPPED | OUTPATIENT
Start: 2024-05-30

## 2024-05-31 ENCOUNTER — TELEPHONE (OUTPATIENT)
Dept: DERMATOLOGY | Facility: CLINIC | Age: 29
End: 2024-05-31
Payer: COMMERCIAL

## 2024-05-31 NOTE — TELEPHONE ENCOUNTER
Received call from [employer]. Patient got refill 2 weeks ago, but is going out of the country and will need his next refill early. Spoke with Alena (pharmacist) and okay'd early refill so patient can take on his trip.

## 2024-07-26 ENCOUNTER — APPOINTMENT (OUTPATIENT)
Dept: SLEEP MEDICINE | Facility: CLINIC | Age: 29
End: 2024-07-26
Payer: COMMERCIAL

## 2024-07-27 DIAGNOSIS — E11.9 TYPE 2 DIABETES MELLITUS WITHOUT COMPLICATION, WITH LONG-TERM CURRENT USE OF INSULIN (MULTI): ICD-10-CM

## 2024-07-27 DIAGNOSIS — Z79.4 TYPE 2 DIABETES MELLITUS WITHOUT COMPLICATION, WITH LONG-TERM CURRENT USE OF INSULIN (MULTI): ICD-10-CM

## 2024-08-19 DIAGNOSIS — E11.9 TYPE 2 DIABETES MELLITUS WITHOUT COMPLICATION, WITH LONG-TERM CURRENT USE OF INSULIN (MULTI): ICD-10-CM

## 2024-08-19 DIAGNOSIS — Z79.4 TYPE 2 DIABETES MELLITUS WITHOUT COMPLICATION, WITH LONG-TERM CURRENT USE OF INSULIN (MULTI): ICD-10-CM

## 2024-08-19 RX ORDER — INSULIN HUMAN 500 [IU]/ML
INJECTION, SOLUTION SUBCUTANEOUS
Qty: 10 ML | Refills: 1 | Status: SHIPPED | OUTPATIENT
Start: 2024-08-19

## 2024-08-22 ENCOUNTER — APPOINTMENT (OUTPATIENT)
Dept: ENDOCRINOLOGY | Facility: CLINIC | Age: 29
End: 2024-08-22
Payer: COMMERCIAL

## 2024-08-22 DIAGNOSIS — E11.9 DIABETES MELLITUS TYPE 2 WITHOUT RETINOPATHY (MULTI): Primary | ICD-10-CM

## 2024-08-22 PROCEDURE — 3060F POS MICROALBUMINURIA REV: CPT | Performed by: STUDENT IN AN ORGANIZED HEALTH CARE EDUCATION/TRAINING PROGRAM

## 2024-08-22 PROCEDURE — 99214 OFFICE O/P EST MOD 30 MIN: CPT | Performed by: STUDENT IN AN ORGANIZED HEALTH CARE EDUCATION/TRAINING PROGRAM

## 2024-08-22 PROCEDURE — 3048F LDL-C <100 MG/DL: CPT | Performed by: STUDENT IN AN ORGANIZED HEALTH CARE EDUCATION/TRAINING PROGRAM

## 2024-08-22 PROCEDURE — 4010F ACE/ARB THERAPY RXD/TAKEN: CPT | Performed by: STUDENT IN AN ORGANIZED HEALTH CARE EDUCATION/TRAINING PROGRAM

## 2024-08-22 RX ORDER — SEMAGLUTIDE 2.68 MG/ML
2 INJECTION, SOLUTION SUBCUTANEOUS
Qty: 3 ML | Refills: 3 | Status: SHIPPED | OUTPATIENT
Start: 2024-08-25 | End: 2024-11-23

## 2024-08-22 NOTE — PROGRESS NOTES
Patient on VF platform in for follow up for T2DM    Subjective   Cedric Neff Fe Lomeli is a 29 y.o. male who presents for follow up for Type 2 diabetes mellitus.   Lab Results   Component Value Date    HGBA1C 8.0 (H) 12/13/2023      Mr. Lomeli is a 29 year-old man with history of IDDM on U500, morbid obesity, NAFLD, psoriasis and hyperlipidemia coming in for follow-up.  date of diagnosis: age 11.Date of last HbA1c: Dec 2023 and results: 8%.   Last seen Jan 2023.. Lost his insurance for few months couldn't get his trulicity. BG were elevated  Following with dermatology for psoriasis  Was admitted to the hospital in January for RSV  Has been having issues with getting trulicity.  Cannot workout yet since he feels short of breath and oxygen drops to 70s  Had to be on oxygen   Sleeps with oxygen and has an apt with sleep medicine Apr 1st  Since the hospital has been only on trulicity and metformin  Went to the hospital 350 lbs and now 288  Now off insulin  Current DM Regimen:.  Was U500 36 units Before Breakfast, lunch and dinner. Not taking it  Had to take it in the [facility] once after he ate a lot. His sugar 220 and took 20 units and after no symptoms  Ozempic 1 mg at home, after taking it 1 day later gasy, burping and diarrhea and when he eats too much  Metformin 1000 mg BID   Lisinopril 10 mg Fenofibrate 160 mg, Atorvastatin 40 mg   BG in am: -130, today 103. if he eats late might go higher,  Before dinner: [phone]  Back to working out every day  Gained 5 lbs during vacation  Eating 2 meals a day with ozempic with a snack.   Snack: Grapes crackers.  Diabetes Surveillance: Eye exam: March 2024 Foot exam/podiatrist: 2024  Opthalmic: Diabetes mellitus with both eyes affected by mild nonproliferative retinopathy without macular edema week ago  Cardiovascular: no coronary artery bypass graft and no coronary artery disease. Atorvastatin and fenofibrate. LDL: 94  Renal: nephropathy UACR 2022 514, but no  "end stage renal disease .On Lisinopril lowered to 10 mg from 20 mg  Neurologic: no neuropathy.    No nausea or vomiting  Burps a lot and sometimes diarrhea  No constipation    Review of Systems  all pertinent systems reviewed and are otherwise negative   Objective   Not done   Physical Exam  In no acute distress  No physical exam done    Lab Review  Glucose (mg/dL)   Date Value   01/24/2024 120 (H)   01/23/2024 106 (H)   01/22/2024 105 (H)     Hemoglobin A1C (%)   Date Value   12/13/2023 8.0 (H)   10/14/2022 6.3 (A)   06/24/2022 6.6 (A)   01/07/2022 8.5 (A)     Bicarbonate (mmol/L)   Date Value   01/24/2024 38 (H)   01/23/2024 36 (H)   01/22/2024 38 (H)     Urea Nitrogen (mg/dL)   Date Value   01/24/2024 12   01/23/2024 15   01/22/2024 20     Creatinine (mg/dL)   Date Value   01/24/2024 0.82   01/23/2024 0.84   01/22/2024 1.15     Lab Results   Component Value Date    CHOL 62 01/19/2024    CHOL 141 12/13/2023    CHOL 115 06/24/2022     Lab Results   Component Value Date    HDL 16.6 01/19/2024    HDL 25.9 12/13/2023    HDL 23.8 (A) 06/24/2022     Lab Results   Component Value Date    LDLCALC 32 01/19/2024    LDLCALC 94 12/13/2023     Lab Results   Component Value Date    TRIG 68 01/19/2024    TRIG 104 12/13/2023    TRIG 146 06/24/2022     No components found for: \"CHOLHDL\"       Assessment/Plan   Mr. Lomeli is a 29 year-old man with history of IDDM on U500, morbid obesity, NAFLD, psoriasis and hyperlipidemia coming in for follow-up.  date of diagnosis: age 11.Date of last HbA1c: Dec 2023 and results: 8%.   Now off insulin  Current DM Regimen:.  Was U500 36 units Before Breakfast, lunch and dinner. Not taking it  Had to take it in the [facility] once after he ate a lot. His sugar 220 and took 20 units and after no symptoms  Ozempic 1 mg at home, after taking it 1 day later gasy, burping and diarrhea and when he eats too much  Metformin 1000 mg BID   Lisinopril 10 mg Fenofibrate 160 mg, Atorvastatin 40 mg   BG in am: " -130, today 103. if he eats late might go higher,  Before dinner: [phone]-  Back to working out every day  Gained 5 lbs during vacation  Eating 2 meals a day with ozempic with a snack.   Snack: Grapes crackers.  Diabetes Surveillance: Eye exam: March 2024 Foot exam/podiatrist: 2024  Opthalmic: Diabetes mellitus with both eyes affected by mild nonproliferative retinopathy without macular edema week ago  Cardiovascular: no coronary artery bypass graft and no coronary artery disease. Atorvastatin and fenofibrate. LDL: 94  Renal: nephropathy UACR 2022 514, but no end stage renal disease .On Lisinopril lowered to 10 mg from 20 mg  Neurologic: no neuropathy.   Plan:  Increase ozempic 2 mg once weekly  Continue Metformin   Continue Lisinopril   Continue fenofibrate and atorvastatin  Will provide a freestyle susie 3 to monitor the sugars  In case BG higher we asked patient to let us know   Blood work     RTC in 3 months  Assessment & Plan  Diabetes mellitus type 2 without retinopathy (Multi)    Orders:    semaglutide (Ozempic) 2 mg/dose (8 mg/3 mL) pen injector; Inject 2 mg under the skin 1 (one) time per week.    Albumin-Creatinine Ratio, Urine Random; Future    Hemoglobin A1C; Future    Lipid Panel; Future    Renal Function Panel; Future    TSH with reflex to Free T4 if abnormal; Future

## 2024-08-23 ENCOUNTER — APPOINTMENT (OUTPATIENT)
Dept: ENDOCRINOLOGY | Facility: HOSPITAL | Age: 29
End: 2024-08-23
Payer: COMMERCIAL

## 2024-09-03 DIAGNOSIS — E11.9 DIABETES MELLITUS TYPE 2 WITHOUT RETINOPATHY (MULTI): ICD-10-CM

## 2024-09-03 RX ORDER — METFORMIN HYDROCHLORIDE 1000 MG/1
TABLET ORAL
Qty: 180 TABLET | Refills: 1 | Status: SHIPPED | OUTPATIENT
Start: 2024-09-03 | End: 2025-03-02

## 2024-09-17 ENCOUNTER — TELEPHONE (OUTPATIENT)
Dept: ENDOCRINOLOGY | Facility: CLINIC | Age: 29
End: 2024-09-17

## 2024-09-17 ENCOUNTER — LAB (OUTPATIENT)
Dept: LAB | Facility: LAB | Age: 29
End: 2024-09-17
Payer: COMMERCIAL

## 2024-09-17 DIAGNOSIS — E11.9 DIABETES MELLITUS TYPE 2 WITHOUT RETINOPATHY (MULTI): ICD-10-CM

## 2024-09-17 PROCEDURE — 84443 ASSAY THYROID STIM HORMONE: CPT

## 2024-09-17 PROCEDURE — 82570 ASSAY OF URINE CREATININE: CPT

## 2024-09-17 PROCEDURE — 83036 HEMOGLOBIN GLYCOSYLATED A1C: CPT

## 2024-09-17 PROCEDURE — 80061 LIPID PANEL: CPT

## 2024-09-17 PROCEDURE — 82043 UR ALBUMIN QUANTITATIVE: CPT

## 2024-09-17 PROCEDURE — 36415 COLL VENOUS BLD VENIPUNCTURE: CPT

## 2024-09-17 PROCEDURE — 80069 RENAL FUNCTION PANEL: CPT

## 2024-09-17 NOTE — TELEPHONE ENCOUNTER
Patient presented today for placement of cgm freestyle jaja. Jaja 3 applied to right upper arm without incident. Patient tolerated cgm placement well.

## 2024-09-18 LAB
ALBUMIN SERPL BCP-MCNC: 4.6 G/DL (ref 3.4–5)
ANION GAP SERPL CALC-SCNC: 13 MMOL/L (ref 10–20)
BUN SERPL-MCNC: 13 MG/DL (ref 6–23)
CALCIUM SERPL-MCNC: 9.4 MG/DL (ref 8.6–10.6)
CHLORIDE SERPL-SCNC: 103 MMOL/L (ref 98–107)
CHOLEST SERPL-MCNC: 88 MG/DL (ref 0–199)
CHOLESTEROL/HDL RATIO: 4.2
CO2 SERPL-SCNC: 28 MMOL/L (ref 21–32)
CREAT SERPL-MCNC: 0.83 MG/DL (ref 0.5–1.3)
CREAT UR-MCNC: 91.2 MG/DL (ref 20–370)
EGFRCR SERPLBLD CKD-EPI 2021: >90 ML/MIN/1.73M*2
EST. AVERAGE GLUCOSE BLD GHB EST-MCNC: 146 MG/DL
GLUCOSE SERPL-MCNC: 106 MG/DL (ref 74–99)
HBA1C MFR BLD: 6.7 %
HDLC SERPL-MCNC: 21.1 MG/DL
LDLC SERPL CALC-MCNC: 45 MG/DL
MICROALBUMIN UR-MCNC: <7 MG/L
MICROALBUMIN/CREAT UR: NORMAL MG/G{CREAT}
NON HDL CHOLESTEROL: 67 MG/DL (ref 0–149)
PHOSPHATE SERPL-MCNC: 2.8 MG/DL (ref 2.5–4.9)
POTASSIUM SERPL-SCNC: 4.5 MMOL/L (ref 3.5–5.3)
SODIUM SERPL-SCNC: 139 MMOL/L (ref 136–145)
TRIGL SERPL-MCNC: 110 MG/DL (ref 0–149)
TSH SERPL-ACNC: 2.79 MIU/L (ref 0.44–3.98)
VLDL: 22 MG/DL (ref 0–40)

## 2024-09-30 DIAGNOSIS — E11.9 TYPE 2 DIABETES MELLITUS WITHOUT COMPLICATION, WITH LONG-TERM CURRENT USE OF INSULIN (MULTI): ICD-10-CM

## 2024-09-30 DIAGNOSIS — Z79.4 TYPE 2 DIABETES MELLITUS WITHOUT COMPLICATION, WITH LONG-TERM CURRENT USE OF INSULIN (MULTI): ICD-10-CM

## 2024-09-30 RX ORDER — BLOOD-GLUCOSE SENSOR
EACH MISCELLANEOUS
Qty: 2 EACH | Refills: 3 | Status: SHIPPED | OUTPATIENT
Start: 2024-09-30

## 2024-10-22 RX ORDER — INSULIN HUMAN 500 [IU]/ML
INJECTION, SOLUTION SUBCUTANEOUS
Qty: 100 ML | Refills: 3 | OUTPATIENT
Start: 2024-10-22

## 2024-11-15 ENCOUNTER — APPOINTMENT (OUTPATIENT)
Dept: PRIMARY CARE | Facility: CLINIC | Age: 29
End: 2024-11-15
Payer: COMMERCIAL

## 2024-11-15 VITALS
WEIGHT: 296 LBS | BODY MASS INDEX: 46.46 KG/M2 | DIASTOLIC BLOOD PRESSURE: 76 MMHG | HEIGHT: 67 IN | SYSTOLIC BLOOD PRESSURE: 122 MMHG

## 2024-11-15 DIAGNOSIS — E11.9 TYPE 2 DIABETES MELLITUS WITHOUT COMPLICATION, WITH LONG-TERM CURRENT USE OF INSULIN (MULTI): ICD-10-CM

## 2024-11-15 DIAGNOSIS — G47.33 OBSTRUCTIVE SLEEP APNEA: ICD-10-CM

## 2024-11-15 DIAGNOSIS — Z79.4 TYPE 2 DIABETES MELLITUS WITHOUT COMPLICATION, WITH LONG-TERM CURRENT USE OF INSULIN (MULTI): ICD-10-CM

## 2024-11-15 DIAGNOSIS — E78.5 DYSLIPIDEMIA: ICD-10-CM

## 2024-11-15 DIAGNOSIS — L40.0 PSORIASIS VULGARIS: ICD-10-CM

## 2024-11-15 DIAGNOSIS — E66.01 CLASS 3 SEVERE OBESITY DUE TO EXCESS CALORIES WITH SERIOUS COMORBIDITY AND BODY MASS INDEX (BMI) OF 50.0 TO 59.9 IN ADULT: ICD-10-CM

## 2024-11-15 DIAGNOSIS — Z00.00 WELL ADULT EXAM: Primary | ICD-10-CM

## 2024-11-15 DIAGNOSIS — E66.813 CLASS 3 SEVERE OBESITY DUE TO EXCESS CALORIES WITH SERIOUS COMORBIDITY AND BODY MASS INDEX (BMI) OF 50.0 TO 59.9 IN ADULT: ICD-10-CM

## 2024-11-15 DIAGNOSIS — E11.9 DIABETES MELLITUS TYPE 2 WITHOUT RETINOPATHY (MULTI): ICD-10-CM

## 2024-11-15 PROCEDURE — 99214 OFFICE O/P EST MOD 30 MIN: CPT | Performed by: STUDENT IN AN ORGANIZED HEALTH CARE EDUCATION/TRAINING PROGRAM

## 2024-11-15 PROCEDURE — 3074F SYST BP LT 130 MM HG: CPT | Performed by: STUDENT IN AN ORGANIZED HEALTH CARE EDUCATION/TRAINING PROGRAM

## 2024-11-15 PROCEDURE — 3048F LDL-C <100 MG/DL: CPT | Performed by: STUDENT IN AN ORGANIZED HEALTH CARE EDUCATION/TRAINING PROGRAM

## 2024-11-15 PROCEDURE — 99395 PREV VISIT EST AGE 18-39: CPT | Performed by: STUDENT IN AN ORGANIZED HEALTH CARE EDUCATION/TRAINING PROGRAM

## 2024-11-15 PROCEDURE — 1036F TOBACCO NON-USER: CPT | Performed by: STUDENT IN AN ORGANIZED HEALTH CARE EDUCATION/TRAINING PROGRAM

## 2024-11-15 PROCEDURE — 3062F POS MACROALBUMINURIA REV: CPT | Performed by: STUDENT IN AN ORGANIZED HEALTH CARE EDUCATION/TRAINING PROGRAM

## 2024-11-15 PROCEDURE — 4010F ACE/ARB THERAPY RXD/TAKEN: CPT | Performed by: STUDENT IN AN ORGANIZED HEALTH CARE EDUCATION/TRAINING PROGRAM

## 2024-11-15 PROCEDURE — 3078F DIAST BP <80 MM HG: CPT | Performed by: STUDENT IN AN ORGANIZED HEALTH CARE EDUCATION/TRAINING PROGRAM

## 2024-11-15 PROCEDURE — 3008F BODY MASS INDEX DOCD: CPT | Performed by: STUDENT IN AN ORGANIZED HEALTH CARE EDUCATION/TRAINING PROGRAM

## 2024-11-15 PROCEDURE — 3044F HG A1C LEVEL LT 7.0%: CPT | Performed by: STUDENT IN AN ORGANIZED HEALTH CARE EDUCATION/TRAINING PROGRAM

## 2024-11-15 RX ORDER — HYDROCHLOROTHIAZIDE 12.5 MG/1
CAPSULE ORAL
Qty: 4 EACH | Refills: 3 | Status: SHIPPED | OUTPATIENT
Start: 2024-11-15

## 2024-11-15 NOTE — PROGRESS NOTES
Subjective   Patient ID: Cedric Lomeli is a 29 y.o. male who presents for Follow-up (refill).  \[facility] [place] [facility]\""  Jerel is here for follow up visit.    His blood glucose continues to be under great control. His weight loss has plateaued recently. He is being restrictive in his diet, ~2,000 calories per day. He is working out daily. He is not having any issues with his psoriasis since being maintained on biologics.    He is interested in meeting with a dietician. He has been taking all of his medications as prescribed.    Review of Systems  12-point ROS was reviewed and is negative, unless otherwise noted in HPI    Objective   Vitals:    11/15/24 1107   BP: 122/76      Physical Exam  GEN: alert, conversant, NAD  HEENT: PERRL, EOMI, MMM, Tms pearly gray bilaterally  NECK: supple, no LAD appreciated  CHEST: CTAB  CV: S1, S2, RRR, no murmurs appreciated  ABD: soft, NT, ND, obese  EXT: no significant LE edema  SKIN: psoriasis much improved    Assessment/Plan   #well adult  - Counseled continued efforts on healthy lifestyle modification including balanced diet, and continued exercise for >5 minutes  - counseled age appropriate vaccines and preventative measures    #Obesity, Class III  #ERIK/OHS  - Counseled continued efforts on lifestyle modification including weight loss, diet, and increased exercise for >5 minutes  - Following up with Sleep Medicine, continue PAP therapy    # HTN  #HFpEF  - Continue Lisinopril 10mg daily  Discussed DASH diet and dietary sodium restrictions.   Increase dietary efforts and physical activity.   - Follow up with Cardiology as scheduled     # Diabetes Mellitus type 2  He has not been using insulin recently.  Hgba1c 6.2% at last check.  Follows with Endocrinology   Continue current management: Ozempic 2mg weekly and Metformin 1000mg BID  Lipid panel, microalbumin checked less than a year ago.  Increase dietary efforts and physical activity.  Routine diabetic retinopathy screening, foot  exam/monofilament testing screening: up-to-date.    #psoriasis  Following with dermatology, doing well on biologic therapy    Health Maintenance:  Vaccines: COVID (x3), Flu (advised), TDAP (2/2024), pneumonia (2013)  Screening: N/A  Labs: Reviewed recent, none needed today     RTC in 6 months, or sooner PRN    Dc Helms, DO

## 2024-11-21 ENCOUNTER — APPOINTMENT (OUTPATIENT)
Dept: ENDOCRINOLOGY | Facility: CLINIC | Age: 29
End: 2024-11-21
Payer: COMMERCIAL

## 2024-11-21 VITALS
WEIGHT: 294.4 LBS | BODY MASS INDEX: 46.11 KG/M2 | HEART RATE: 105 BPM | TEMPERATURE: 97.2 F | SYSTOLIC BLOOD PRESSURE: 108 MMHG | DIASTOLIC BLOOD PRESSURE: 64 MMHG

## 2024-11-21 DIAGNOSIS — E11.9 DIABETES MELLITUS TYPE 2 WITHOUT RETINOPATHY (MULTI): Primary | ICD-10-CM

## 2024-11-21 DIAGNOSIS — E66.813 CLASS 3 SEVERE OBESITY DUE TO EXCESS CALORIES WITH SERIOUS COMORBIDITY AND BODY MASS INDEX (BMI) OF 50.0 TO 59.9 IN ADULT: ICD-10-CM

## 2024-11-21 DIAGNOSIS — E11.9 TYPE 2 DIABETES MELLITUS WITHOUT COMPLICATION, WITH LONG-TERM CURRENT USE OF INSULIN (MULTI): ICD-10-CM

## 2024-11-21 DIAGNOSIS — Z79.4 TYPE 2 DIABETES MELLITUS WITHOUT COMPLICATION, WITH LONG-TERM CURRENT USE OF INSULIN (MULTI): ICD-10-CM

## 2024-11-21 DIAGNOSIS — E66.01 CLASS 3 SEVERE OBESITY DUE TO EXCESS CALORIES WITH SERIOUS COMORBIDITY AND BODY MASS INDEX (BMI) OF 50.0 TO 59.9 IN ADULT: ICD-10-CM

## 2024-11-21 PROCEDURE — 99214 OFFICE O/P EST MOD 30 MIN: CPT | Performed by: STUDENT IN AN ORGANIZED HEALTH CARE EDUCATION/TRAINING PROGRAM

## 2024-11-21 PROCEDURE — 4010F ACE/ARB THERAPY RXD/TAKEN: CPT | Performed by: STUDENT IN AN ORGANIZED HEALTH CARE EDUCATION/TRAINING PROGRAM

## 2024-11-21 PROCEDURE — 3048F LDL-C <100 MG/DL: CPT | Performed by: STUDENT IN AN ORGANIZED HEALTH CARE EDUCATION/TRAINING PROGRAM

## 2024-11-21 PROCEDURE — 95251 CONT GLUC MNTR ANALYSIS I&R: CPT | Performed by: STUDENT IN AN ORGANIZED HEALTH CARE EDUCATION/TRAINING PROGRAM

## 2024-11-21 PROCEDURE — 3044F HG A1C LEVEL LT 7.0%: CPT | Performed by: STUDENT IN AN ORGANIZED HEALTH CARE EDUCATION/TRAINING PROGRAM

## 2024-11-21 PROCEDURE — 3078F DIAST BP <80 MM HG: CPT | Performed by: STUDENT IN AN ORGANIZED HEALTH CARE EDUCATION/TRAINING PROGRAM

## 2024-11-21 PROCEDURE — 3074F SYST BP LT 130 MM HG: CPT | Performed by: STUDENT IN AN ORGANIZED HEALTH CARE EDUCATION/TRAINING PROGRAM

## 2024-11-21 PROCEDURE — 3062F POS MACROALBUMINURIA REV: CPT | Performed by: STUDENT IN AN ORGANIZED HEALTH CARE EDUCATION/TRAINING PROGRAM

## 2024-11-21 RX ORDER — TIRZEPATIDE 10 MG/.5ML
10 INJECTION, SOLUTION SUBCUTANEOUS
Qty: 2 ML | Refills: 3 | Status: SHIPPED | OUTPATIENT
Start: 2024-11-21

## 2024-11-21 RX ORDER — HYDROCHLOROTHIAZIDE 12.5 MG/1
CAPSULE ORAL
Qty: 4 EACH | Refills: 3 | Status: SHIPPED | OUTPATIENT
Start: 2024-11-21 | End: 2024-12-05 | Stop reason: SDUPTHER

## 2024-11-21 ASSESSMENT — PAIN SCALES - GENERAL: PAINLEVEL_OUTOF10: 0-NO PAIN

## 2024-11-21 NOTE — PATIENT INSTRUCTIONS
Swicth from ozempic to mounjaro 10 mg weekly If mounjaro not approved stay on ozempic 2 mg once weekly  Continue Metformin   Continue Lisinopril   Continue fenofibrate and atorvastatin   freestyle susie 3 plus renewed to monitor the sugars    Blood work before next apt    RTC in 6 months

## 2024-11-21 NOTE — PROGRESS NOTES
Patient coming in for follow up for T2DM    Subjective   Cedric Neff Fe Lomeli is a 29 y.o. male who presents for follow up for Type 2 diabetes mellitus.   Lab Results   Component Value Date    HGBA1C 6.7 (H) 09/17/2024      Mr. Lomeli is a 29 year-old man with history of IDDM on U500, morbid obesity, NAFLD, psoriasis and hyperlipidemia coming in for follow-up.  date of diagnosis: age 11.Date of last HbA1c: Sep 2024 and results: 6.7%.   Now off insulin  Current DM Regimen:.  Was U500 36 units Before Breakfast, lunch and dinner. Not taking it  Had to take it in the [facility] once after he ate a lot. His sugar 220 and took 20 units and after no symptoms  Ozempic was increased to 2 mg during last visit. No insulin in the past 3 months  Metformin 1000 mg BID   Lisinopril 10 mg Fenofibrate 160 mg, Atorvastatin 40 mg       BG was reading low on sensor 57 fingerstick 87  Less side effects with ozempic   Back to working out every day  Eating better  Not eating rice a lot once in a blue moon.  Trying to count his calories  Breakfast: 2 hot pockets. Now started eggs with tortilla  Lunch: Does not eat lunch  Around 5 pm: Chicken or pork with a cup of rice with vegetables, spinach, lettuce   On weekends eats too much  Stopped losing weight  Eating 2 meals a day with ozempic with a snack.   Snack: Grapes crackers.  Diabetes Surveillance: Eye exam: March 2024 Foot exam/podiatrist: 2024 next in [facility]  Opthalmic: Diabetes mellitus with both eyes affected by mild nonproliferative retinopathy without macular edema week ago  Cardiovascular: no coronary artery bypass graft and no coronary artery disease. Atorvastatin and fenofibrate. LDL: 94  Renal: nephropathy UACR 2022 514, but no end stage renal disease .On Lisinopril lowered to 10 mg from 20 mg  Neurologic: no neuropathy.     Review of Systems  all pertinent systems reviewed and are otherwise negative   Objective   Visit Vitals  /64 (BP Location: Right arm, Patient  Position: Sitting, BP Cuff Size: Adult)   Pulse 105   Temp 36.2 °C (97.2 °F)   Wt 134 kg (294 lb 6.4 oz)   BMI 46.11 kg/m²   Smoking Status Never   BSA 2.52 m²      Physical Exam  Constitutional:       General: He is not in acute distress.     Appearance: Normal appearance. He is obese.   Eyes:      Extraocular Movements: Extraocular movements intact.      Pupils: Pupils are equal, round, and reactive to light.   Cardiovascular:      Rate and Rhythm: Normal rate and regular rhythm.   Pulmonary:      Effort: Pulmonary effort is normal. No respiratory distress.      Breath sounds: Normal breath sounds.   Abdominal:      General: Bowel sounds are normal.      Palpations: Abdomen is soft.      Tenderness: There is no abdominal tenderness.   Skin:     Coloration: Skin is not jaundiced or pale.      Findings: No erythema or rash.   Neurological:      General: No focal deficit present.      Mental Status: He is alert and oriented to person, place, and time.      Deep Tendon Reflexes: Reflexes normal.   Psychiatric:         Mood and Affect: Mood normal.         Behavior: Behavior normal.         Lab Review  Glucose (mg/dL)   Date Value   09/17/2024 106 (H)   01/24/2024 120 (H)   01/23/2024 106 (H)     Hemoglobin A1C (%)   Date Value   09/17/2024 6.7 (H)   12/13/2023 8.0 (H)   10/14/2022 6.3 (A)   06/24/2022 6.6 (A)   01/07/2022 8.5 (A)     Bicarbonate (mmol/L)   Date Value   09/17/2024 28   01/24/2024 38 (H)   01/23/2024 36 (H)     Urea Nitrogen (mg/dL)   Date Value   09/17/2024 13   01/24/2024 12   01/23/2024 15     Creatinine (mg/dL)   Date Value   09/17/2024 0.83   01/24/2024 0.82   01/23/2024 0.84     Lab Results   Component Value Date    CHOL 88 09/17/2024    CHOL 62 01/19/2024    CHOL 141 12/13/2023     Lab Results   Component Value Date    HDL 21.1 09/17/2024    HDL 16.6 01/19/2024    HDL 25.9 12/13/2023     Lab Results   Component Value Date    LDLCALC 45 09/17/2024    LDLCALC 32 01/19/2024    LDLCALC 94 12/13/2023  "    Lab Results   Component Value Date    TRIG 110 2024    TRIG 68 2024    TRIG 104 2023     No components found for: \"CHOLHDL\"   Lab Results   Component Value Date    TSH 2.79 2024       Assessment/Plan   Mr. Lomeli is a 29 year-old man with history of IDDM on U500, morbid obesity, NAFLD, psoriasis and hyperlipidemia coming in for follow-up.  date of diagnosis: age 11.Date of last HbA1c: Sep 2024 and results: 6.7%.   Now off insulin  Current DM Regimen:.  Was U500 36 units Before Breakfast, lunch and dinner. Not taking it  Had to take it in the [facility] once after he ate a lot. His sugar 220 and took 20 units and after no symptoms  Ozempic was increased to 2 mg during last visit. No insulin in the past 3 months  Metformin 1000 mg BID   Lisinopril 10 mg Fenofibrate 160 mg, Atorvastatin 40 mg   AVG B TIR: 95%  BG was reading low on sensor 57 fingerstick 87  Less side effects with ozempic   Back to working out every day  Stopped losing weight  Diabetes Surveillance: Eye exam: 2024 Foot exam/podiatrist:  next in [facility]  Opthalmic: Diabetes mellitus with both eyes affected by mild nonproliferative retinopathy without macular edema week ago  Cardiovascular: no coronary artery bypass graft and no coronary artery disease. Atorvastatin and fenofibrate. LDL: 94  Renal: nephropathy UACR  514, but no end stage renal disease .On Lisinopril lowered to 10 mg from 20 mg  Neurologic: no neuropathy.   Plan:  Swicth from ozempic to mounjaro 10 mg weekly If mounjaro not approved stay on ozempic 2 mg once weekly  Continue Metformin   Continue Lisinopril   Continue fenofibrate and atorvastatin   freestyle susie 3 plus renewed to monitor the sugars    Blood work before next apt    RTC in 6 months  Assessment & Plan  Diabetes mellitus type 2 without retinopathy (Multi)    Orders:    tirzepatide (Mounjaro) 10 mg/0.5 mL pen injector; Inject 10 mg under the skin every 7 days.    " Albumin-Creatinine Ratio, Urine Random; Future    Hemoglobin A1C; Future    Lipid Panel; Future    Renal Function Panel; Future    Class 3 severe obesity due to excess calories with serious comorbidity and body mass index (BMI) of 50.0 to 59.9 in adult    Orders:    tirzepatide (Mounjaro) 10 mg/0.5 mL pen injector; Inject 10 mg under the skin every 7 days.    Type 2 diabetes mellitus without complication, with long-term current use of insulin (Multi)             .

## 2024-12-05 DIAGNOSIS — E11.9 TYPE 2 DIABETES MELLITUS WITHOUT COMPLICATION, WITH LONG-TERM CURRENT USE OF INSULIN (MULTI): ICD-10-CM

## 2024-12-05 DIAGNOSIS — Z79.4 TYPE 2 DIABETES MELLITUS WITHOUT COMPLICATION, WITH LONG-TERM CURRENT USE OF INSULIN (MULTI): ICD-10-CM

## 2024-12-05 RX ORDER — HYDROCHLOROTHIAZIDE 12.5 MG/1
CAPSULE ORAL
Qty: 4 EACH | Refills: 3 | Status: SHIPPED | OUTPATIENT
Start: 2024-12-05

## 2024-12-06 NOTE — ASSESSMENT & PLAN NOTE
Orders:    tirzepatide (Mounjaro) 10 mg/0.5 mL pen injector; Inject 10 mg under the skin every 7 days.

## 2025-01-09 ENCOUNTER — APPOINTMENT (OUTPATIENT)
Dept: PODIATRY | Facility: CLINIC | Age: 30
End: 2025-01-09
Payer: COMMERCIAL

## 2025-01-10 ENCOUNTER — APPOINTMENT (OUTPATIENT)
Dept: PODIATRY | Facility: CLINIC | Age: 30
End: 2025-01-10
Payer: COMMERCIAL

## 2025-02-03 DIAGNOSIS — E11.9 DIABETES MELLITUS TYPE 2 WITHOUT RETINOPATHY (MULTI): ICD-10-CM

## 2025-02-03 RX ORDER — LISINOPRIL 10 MG/1
10 TABLET ORAL DAILY
Qty: 90 TABLET | Refills: 0 | Status: SHIPPED | OUTPATIENT
Start: 2025-02-03

## 2025-03-06 DIAGNOSIS — E11.9 DIABETES MELLITUS TYPE 2 WITHOUT RETINOPATHY (MULTI): ICD-10-CM

## 2025-03-06 RX ORDER — METFORMIN HYDROCHLORIDE 1000 MG/1
TABLET ORAL
Qty: 180 TABLET | Refills: 0 | Status: SHIPPED | OUTPATIENT
Start: 2025-03-06

## 2025-03-12 DIAGNOSIS — E11.9 DIABETES MELLITUS TYPE 2 WITHOUT RETINOPATHY (MULTI): ICD-10-CM

## 2025-03-12 RX ORDER — FENOFIBRATE 160 MG/1
160 TABLET ORAL DAILY
Qty: 90 TABLET | Refills: 0 | Status: SHIPPED | OUTPATIENT
Start: 2025-03-12

## 2025-03-12 RX ORDER — ATORVASTATIN CALCIUM 40 MG/1
TABLET, FILM COATED ORAL
Qty: 90 TABLET | Refills: 0 | Status: SHIPPED | OUTPATIENT
Start: 2025-03-12

## 2025-03-15 ASSESSMENT — SLIT LAMP EXAM - LIDS
COMMENTS: GOOD POSITION
COMMENTS: GOOD POSITION

## 2025-03-15 ASSESSMENT — EXTERNAL EXAM - RIGHT EYE: OD_EXAM: NORMAL

## 2025-03-15 ASSESSMENT — EXTERNAL EXAM - LEFT EYE: OS_EXAM: NORMAL

## 2025-03-15 ASSESSMENT — CUP TO DISC RATIO
OS_RATIO: 0.30
OD_RATIO: 0.30

## 2025-03-16 NOTE — PROGRESS NOTES
Diabetes  -OCT macula (3/11/24) - SS: 10/10 OD and 6/10 OS. Normal thickness and contour OU. Intact EZ OU. No edema OU. 252/247. Stable from 1/19/23.   -Per patient, when blood glucose levels were elevated, used to have episodes of seeing spots in vision/difficulty focusing upon awakening in the morning. Now that BG improved, not symptomatic anymore. Advised to monitor and recommended checking blood glucose levels if symptoms return.   -HbA1c= 6.7 (9/17/24). No diabetic retinopathy seen on exam. Continue close monitoring of blood glucose, blood pressure, and cholesterol. Plan for annual dilated eye exam.    Combined form of age-related cataract, right eyeH25.811  Combined form of age-related cataract, left eyeH25.812  -Not visually significant at this time. Monitor.     Floppy eyelids, bilateral  -Patient being evaluated for ERIK. Will need new referral for sleep study. Monitor.     Myopia  Astigmatism  -Possible mild amblyopia OU due to cyl?  -New Rx for glasses given per patient request. Patient's signature obtained to acknowledge and confirm that a paper copy of glasses Rx was given to patient in compliance with [employer] [facility] Eyeglass Rule. Electronic copy of Rx will also be available via [employer].       Attending Attestation Statement for Evaluation and Management Services:    I was physically present and/or personally examined the patient during the evaluation of this patient. I reviewed the documentation and confirm the resident's note.

## 2025-03-17 ENCOUNTER — APPOINTMENT (OUTPATIENT)
Dept: OPHTHALMOLOGY | Facility: CLINIC | Age: 30
End: 2025-03-17
Payer: COMMERCIAL

## 2025-03-17 DIAGNOSIS — H52.203 ASTIGMATISM OF BOTH EYES, UNSPECIFIED TYPE: ICD-10-CM

## 2025-03-17 DIAGNOSIS — E10.9 TYPE 1 DIABETES MELLITUS WITHOUT RETINOPATHY (MULTI): Primary | ICD-10-CM

## 2025-03-17 DIAGNOSIS — H52.13 MYOPIA OF BOTH EYES: ICD-10-CM

## 2025-03-17 PROCEDURE — 92015 DETERMINE REFRACTIVE STATE: CPT | Performed by: OPHTHALMOLOGY

## 2025-03-17 PROCEDURE — 99213 OFFICE O/P EST LOW 20 MIN: CPT | Performed by: OPHTHALMOLOGY

## 2025-03-17 ASSESSMENT — ENCOUNTER SYMPTOMS
RESPIRATORY NEGATIVE: 0
NEUROLOGICAL NEGATIVE: 0
PSYCHIATRIC NEGATIVE: 0
ENDOCRINE NEGATIVE: 0
HEMATOLOGIC/LYMPHATIC NEGATIVE: 0
GASTROINTESTINAL NEGATIVE: 0
CARDIOVASCULAR NEGATIVE: 0
CONSTITUTIONAL NEGATIVE: 0
MUSCULOSKELETAL NEGATIVE: 0
ALLERGIC/IMMUNOLOGIC NEGATIVE: 0
EYES NEGATIVE: 1

## 2025-03-17 ASSESSMENT — REFRACTION_MANIFEST
OS_SPHERE: -1.50
OD_CYLINDER: -2.25
OD_SPHERE: -1.25
OS_CYLINDER: -2.75
OS_AXIS: 175
OD_AXIS: 160

## 2025-03-17 ASSESSMENT — TONOMETRY
OS_IOP_MMHG: 15
IOP_METHOD: GOLDMANN APPLANATION
OD_IOP_MMHG: 16

## 2025-03-17 ASSESSMENT — REFRACTION_WEARINGRX
OS_SPHERE: -1.25
OD_CYLINDER: -2.25
OD_AXIS: 160
OD_SPHERE: -1.00
OS_CYLINDER: -3.00
OS_AXIS: 175

## 2025-03-17 ASSESSMENT — VISUAL ACUITY
OD_CC: 20/25+
METHOD: SNELLEN - LINEAR
CORRECTION_TYPE: GLASSES
OS_CC: 20/40

## 2025-03-17 NOTE — LETTER
March 17, 2025     Patient: Cedric Lomeli   YOB: 1995   Date of Visit: 3/17/2025       To Whom It May Concern:    Cedric Lomeli had a doctor's appointment with me today on 3/17/2025. He may return to work on 3/18/2025 .    If you have any questions or concerns, please don't hesitate to call.         Sincerely,        Desiree Simmons MD    CC: No Recipients

## 2025-04-01 DIAGNOSIS — L40.0 PLAQUE PSORIASIS: ICD-10-CM

## 2025-04-02 RX ORDER — BIMEKIZUMAB 160 MG/ML
320 INJECTION, SOLUTION SUBCUTANEOUS
Qty: 2 ML | Refills: 3 | Status: SHIPPED | OUTPATIENT
Start: 2025-04-02

## 2025-04-13 LAB
ALBUMIN SERPL-MCNC: 5 G/DL (ref 3.6–5.1)
ALBUMIN/CREAT UR: NORMAL
BUN SERPL-MCNC: 13 MG/DL (ref 7–25)
BUN/CREAT SERPL: NORMAL (CALC) (ref 6–22)
CALCIUM SERPL-MCNC: 9.4 MG/DL (ref 8.6–10.3)
CHLORIDE SERPL-SCNC: 103 MMOL/L (ref 98–110)
CHOLEST SERPL-MCNC: 81 MG/DL
CHOLEST/HDLC SERPL: 3.7 (CALC)
CO2 SERPL-SCNC: 28 MMOL/L (ref 20–32)
CREAT SERPL-MCNC: 0.92 MG/DL (ref 0.6–1.24)
CREAT UR-MCNC: NORMAL MG/DL
EGFRCR SERPLBLD CKD-EPI 2021: 115 ML/MIN/1.73M2
EST. AVERAGE GLUCOSE BLD GHB EST-MCNC: 120 MG/DL
EST. AVERAGE GLUCOSE BLD GHB EST-SCNC: 6.6 MMOL/L
GLUCOSE SERPL-MCNC: 99 MG/DL (ref 65–99)
HBA1C MFR BLD: 5.8 % OF TOTAL HGB
HDLC SERPL-MCNC: 22 MG/DL
LDLC SERPL CALC-MCNC: 39 MG/DL (CALC)
MICROALBUMIN UR-MCNC: NORMAL
NONHDLC SERPL-MCNC: 59 MG/DL (CALC)
PHOSPHATE SERPL-MCNC: 3.8 MG/DL (ref 2.5–4.5)
POTASSIUM SERPL-SCNC: 4.9 MMOL/L (ref 3.5–5.3)
SODIUM SERPL-SCNC: 138 MMOL/L (ref 135–146)
TRIGL SERPL-MCNC: 112 MG/DL

## 2025-04-14 LAB
ALBUMIN SERPL-MCNC: 5 G/DL (ref 3.6–5.1)
ALBUMIN/CREAT UR: 6 MG/G CREAT
BUN SERPL-MCNC: 13 MG/DL (ref 7–25)
BUN/CREAT SERPL: NORMAL (CALC) (ref 6–22)
CALCIUM SERPL-MCNC: 9.4 MG/DL (ref 8.6–10.3)
CHLORIDE SERPL-SCNC: 103 MMOL/L (ref 98–110)
CHOLEST SERPL-MCNC: 81 MG/DL
CHOLEST/HDLC SERPL: 3.7 (CALC)
CO2 SERPL-SCNC: 28 MMOL/L (ref 20–32)
CREAT SERPL-MCNC: 0.92 MG/DL (ref 0.6–1.24)
CREAT UR-MCNC: 221 MG/DL (ref 20–320)
EGFRCR SERPLBLD CKD-EPI 2021: 115 ML/MIN/1.73M2
EST. AVERAGE GLUCOSE BLD GHB EST-MCNC: 120 MG/DL
EST. AVERAGE GLUCOSE BLD GHB EST-SCNC: 6.6 MMOL/L
GLUCOSE SERPL-MCNC: 99 MG/DL (ref 65–99)
HBA1C MFR BLD: 5.8 % OF TOTAL HGB
HDLC SERPL-MCNC: 22 MG/DL
LDLC SERPL CALC-MCNC: 39 MG/DL (CALC)
MICROALBUMIN UR-MCNC: 1.4 MG/DL
NONHDLC SERPL-MCNC: 59 MG/DL (CALC)
PHOSPHATE SERPL-MCNC: 3.8 MG/DL (ref 2.5–4.5)
POTASSIUM SERPL-SCNC: 4.9 MMOL/L (ref 3.5–5.3)
SODIUM SERPL-SCNC: 138 MMOL/L (ref 135–146)
TRIGL SERPL-MCNC: 112 MG/DL

## 2025-04-21 DIAGNOSIS — E11.9 DIABETES MELLITUS TYPE 2 WITHOUT RETINOPATHY (MULTI): ICD-10-CM

## 2025-04-22 ENCOUNTER — APPOINTMENT (OUTPATIENT)
Facility: CLINIC | Age: 30
End: 2025-04-22
Payer: COMMERCIAL

## 2025-04-22 VITALS
BODY MASS INDEX: 43.47 KG/M2 | SYSTOLIC BLOOD PRESSURE: 133 MMHG | WEIGHT: 277 LBS | HEIGHT: 67 IN | TEMPERATURE: 97.7 F | DIASTOLIC BLOOD PRESSURE: 80 MMHG | HEART RATE: 94 BPM

## 2025-04-22 DIAGNOSIS — E11.9 DIABETES MELLITUS TYPE 2 WITHOUT RETINOPATHY (MULTI): Primary | ICD-10-CM

## 2025-04-22 DIAGNOSIS — E78.5 DYSLIPIDEMIA: ICD-10-CM

## 2025-04-22 DIAGNOSIS — E66.813 CLASS 3 SEVERE OBESITY DUE TO EXCESS CALORIES WITH SERIOUS COMORBIDITY AND BODY MASS INDEX (BMI) OF 50.0 TO 59.9 IN ADULT: ICD-10-CM

## 2025-04-22 PROCEDURE — 3079F DIAST BP 80-89 MM HG: CPT | Performed by: STUDENT IN AN ORGANIZED HEALTH CARE EDUCATION/TRAINING PROGRAM

## 2025-04-22 PROCEDURE — 95251 CONT GLUC MNTR ANALYSIS I&R: CPT | Performed by: STUDENT IN AN ORGANIZED HEALTH CARE EDUCATION/TRAINING PROGRAM

## 2025-04-22 PROCEDURE — 3075F SYST BP GE 130 - 139MM HG: CPT | Performed by: STUDENT IN AN ORGANIZED HEALTH CARE EDUCATION/TRAINING PROGRAM

## 2025-04-22 PROCEDURE — 99214 OFFICE O/P EST MOD 30 MIN: CPT | Performed by: STUDENT IN AN ORGANIZED HEALTH CARE EDUCATION/TRAINING PROGRAM

## 2025-04-22 PROCEDURE — 3008F BODY MASS INDEX DOCD: CPT | Performed by: STUDENT IN AN ORGANIZED HEALTH CARE EDUCATION/TRAINING PROGRAM

## 2025-04-22 PROCEDURE — 4010F ACE/ARB THERAPY RXD/TAKEN: CPT | Performed by: STUDENT IN AN ORGANIZED HEALTH CARE EDUCATION/TRAINING PROGRAM

## 2025-04-22 RX ORDER — LISINOPRIL 10 MG/1
10 TABLET ORAL DAILY
Qty: 90 TABLET | Refills: 3 | Status: SHIPPED | OUTPATIENT
Start: 2025-04-22

## 2025-04-22 RX ORDER — METFORMIN HYDROCHLORIDE 1000 MG/1
1000 TABLET ORAL
Qty: 180 TABLET | Refills: 3 | Status: SHIPPED | OUTPATIENT
Start: 2025-04-22

## 2025-04-22 RX ORDER — FENOFIBRATE 160 MG/1
160 TABLET ORAL DAILY
Qty: 90 TABLET | Refills: 3 | Status: SHIPPED | OUTPATIENT
Start: 2025-04-22

## 2025-04-22 RX ORDER — ATORVASTATIN CALCIUM 40 MG/1
TABLET, FILM COATED ORAL
Qty: 90 TABLET | Refills: 3 | Status: SHIPPED | OUTPATIENT
Start: 2025-04-22

## 2025-04-22 RX ORDER — TIRZEPATIDE 10 MG/.5ML
10 INJECTION, SOLUTION SUBCUTANEOUS
Qty: 2 ML | Refills: 3 | Status: SHIPPED | OUTPATIENT
Start: 2025-04-22

## 2025-04-22 NOTE — PROGRESS NOTES
"Patient coming in for follow up for T2DM    Subjective   Cedric Neff Fe Lomeli is a 29 y.o. male who presents for follow up for Type 2 diabetes mellitus.     Lab Results   Component Value Date    HGBA1C 5.8 (H) 04/12/2025      Mr. Lomeli is a 29 year-old man with history of IDDM on U500, morbid obesity, NAFLD, psoriasis and hyperlipidemia coming in for follow-up.  date of diagnosis: age 11.Date of last HbA1c: April 2025  and results: 5.8%.   Now off insulin  Current DM Regimen:.  Was U500 36 units Before Breakfast, lunch and dinner. Not taking it  Ozempic was increased to 2 mg then switched to mounjaro 10 mg after  last visit.  Metformin 1000 mg BID   Lisinopril 10 mg Fenofibrate 160 mg, Atorvastatin 40 mg   Hypoglycemia on susie but not true   Had symptoms once when he was in the gym     BG was reading low on sensor 57 fingerstick 87  Breakfast 2 eggs with one piece of bread  Weekend dinner heavier cup of rice sea food and bowl of chicken. Vegetable  Back to working out every day 5 days a week an 1-1.5 hours  Decreased appetite  Previously was having abdominal pain after injection and gas but that improved  Diabetes Surveillance: Eye exam: March 2025 Foot exam/podiatrist: 2024 next in [facility]  Opthalmic: No diabetic retinopathy seen on exam.   Cardiovascular: no coronary artery bypass graft and no coronary artery disease. Atorvastatin and fenofibrate. LDL: 39. Tg 112  Renal: nephropathy UACR 2022 514, but no end stage renal disease .On Lisinopril lowered to 10 mg from 20 mg  Neurologic: no neuropathy.   Review of Systems  all pertinent systems reviewed and are otherwise negative   Objective   Visit Vitals  /80 (BP Location: Left arm, Patient Position: Sitting, BP Cuff Size: Large adult)   Pulse 94   Temp 36.5 °C (97.7 °F)   Ht 1.702 m (5' 7\")   Wt 126 kg (277 lb)   BMI 43.38 kg/m²   Smoking Status Never   BSA 2.44 m²      Physical Exam  Constitutional:       General: He is not in acute distress.     " Appearance: Normal appearance. He is obese.   HENT:      Head: Normocephalic and atraumatic.   Eyes:      Extraocular Movements: Extraocular movements intact.      Pupils: Pupils are equal, round, and reactive to light.   Cardiovascular:      Rate and Rhythm: Normal rate and regular rhythm.   Pulmonary:      Effort: Pulmonary effort is normal. No respiratory distress.      Breath sounds: Normal breath sounds.   Abdominal:      General: Bowel sounds are normal.      Palpations: Abdomen is soft.      Tenderness: There is no abdominal tenderness.   Skin:     Coloration: Skin is not jaundiced or pale.      Findings: No erythema or rash.   Neurological:      General: No focal deficit present.      Mental Status: He is alert and oriented to person, place, and time.      Deep Tendon Reflexes: Reflexes normal.   Psychiatric:         Mood and Affect: Mood normal.         Behavior: Behavior normal.         Lab Review  GLUCOSE (mg/dL)   Date Value   04/12/2025 99     Glucose (mg/dL)   Date Value   09/17/2024 106 (H)   01/24/2024 120 (H)   01/23/2024 106 (H)     HEMOGLOBIN A1c (% of total Hgb)   Date Value   04/12/2025 5.8 (H)     Hemoglobin A1C (%)   Date Value   09/17/2024 6.7 (H)   12/13/2023 8.0 (H)   10/14/2022 6.3 (A)   06/24/2022 6.6 (A)   01/07/2022 8.5 (A)     CARBON DIOXIDE (mmol/L)   Date Value   04/12/2025 28     Bicarbonate (mmol/L)   Date Value   09/17/2024 28   01/24/2024 38 (H)   01/23/2024 36 (H)     UREA NITROGEN (BUN) (mg/dL)   Date Value   04/12/2025 13     Urea Nitrogen (mg/dL)   Date Value   09/17/2024 13   01/24/2024 12   01/23/2024 15     Creatinine (mg/dL)   Date Value   09/17/2024 0.83   01/24/2024 0.82   01/23/2024 0.84     CREATININE (mg/dL)   Date Value   04/12/2025 0.92     Lab Results   Component Value Date    CHOL 81 04/12/2025    CHOL 88 09/17/2024    CHOL 62 01/19/2024     Lab Results   Component Value Date    HDL 22 (L) 04/12/2025    HDL 21.1 09/17/2024    HDL 16.6 01/19/2024     Lab Results  "  Component Value Date    LDLCALC 39 04/12/2025    LDLCALC 45 09/17/2024    LDLCALC 32 01/19/2024     Lab Results   Component Value Date    TRIG 112 04/12/2025    TRIG 110 09/17/2024    TRIG 68 01/19/2024     No components found for: \"CHOLHDL\"   Lab Results   Component Value Date    TSH 2.79 09/17/2024       Assessment/Plan   Mr. Lomeli is a 29 year-old man with history of IDDM on U500, morbid obesity, NAFLD, psoriasis and hyperlipidemia coming in for follow-up.  date of diagnosis: age 11.Date of last HbA1c: April 2025  and results: 5.8%.   Now off insulin  Current DM Regimen:.  Was U500 36 units Before Breakfast, lunch and dinner. Not taking it  Ozempic was increased to 2 mg then switched to mounjaro 10 mg after  last visit.  Metformin 1000 mg BID   Lisinopril 10 mg Fenofibrate 160 mg, Atorvastatin 40 mg   CGM reviewed  AVG BG 82 TIR 78% low 21  Hypoglycemia on susie but not true  BG was reading low on sensor 57 fingerstick 87  Diabetes Surveillance: Eye exam: March 2025 Foot exam/podiatrist: 2024 next in [facility]  Opthalmic: No diabetic retinopathy seen on exam.   Cardiovascular: no coronary artery bypass graft and no coronary artery disease. Atorvastatin and fenofibrate. LDL: 39. Tg 112  Renal: nephropathy UACR 2022 514, but no end stage renal disease .On Lisinopril lowered to 10 mg from 20 mg  Neurologic: no neuropathy.     Plan  Continue mounjaro 10 mg once weekly.   After coming back from [facility]  if weight is going up we asked patient to us know I will increase it 12.5 mg  Continue Metformin   Continue Lisinopril   Continue fenofibrate and atorvastatin  Check BG 3-4 times a week     Blood work before next apt     RTC in 6 months  Assessment & Plan  Diabetes mellitus type 2 without retinopathy (Multi)    Orders:    lisinopril 10 mg tablet; Take 1 tablet (10 mg) by mouth once daily.    atorvastatin (Lipitor) 40 mg tablet; TAKE 1 TABLET BY MOUTH ONCE DAILY AS DIRECTED    fenofibrate (Triglide) 160 mg tablet; " Take 1 tablet (160 mg) by mouth once daily.    metFORMIN (Glucophage) 1,000 mg tablet; Take 1 tablet (1,000 mg) by mouth 2 times daily (morning and late afternoon).    tirzepatide (Mounjaro) 10 mg/0.5 mL pen injector; Inject 10 mg under the skin every 7 days.    Hemoglobin A1C; Future    Lipid Panel; Future    Renal Function Panel; Future    Follow Up In Endocrinology; Future    Class 3 severe obesity due to excess calories with serious comorbidity and body mass index (BMI) of 50.0 to 59.9 in adult    Orders:    tirzepatide (Mounjaro) 10 mg/0.5 mL pen injector; Inject 10 mg under the skin every 7 days.    Follow Up In Endocrinology; Future    Dyslipidemia    Orders:    Lipid Panel; Future    Follow Up In Endocrinology; Future

## 2025-04-22 NOTE — PATIENT INSTRUCTIONS
Continue mounjaro 10 mg once weekly.   After you come back if weight is going up let me know I will increase it 12.5 mg  Continue Metformin   Continue Lisinopril   Continue fenofibrate and atorvastatin  Check BG 3-4 times a week     Blood work before next apt     RTC in 6 months

## 2025-04-23 RX ORDER — LISINOPRIL 10 MG/1
10 TABLET ORAL DAILY
Qty: 90 TABLET | Refills: 0 | Status: SHIPPED | OUTPATIENT
Start: 2025-04-23

## 2025-04-25 ENCOUNTER — APPOINTMENT (OUTPATIENT)
Dept: PRIMARY CARE | Facility: CLINIC | Age: 30
End: 2025-04-25
Payer: COMMERCIAL

## 2025-04-25 VITALS — DIASTOLIC BLOOD PRESSURE: 76 MMHG | SYSTOLIC BLOOD PRESSURE: 124 MMHG

## 2025-04-25 DIAGNOSIS — I10 PRIMARY HYPERTENSION: ICD-10-CM

## 2025-04-25 DIAGNOSIS — G47.33 OBSTRUCTIVE SLEEP APNEA: Primary | ICD-10-CM

## 2025-04-25 DIAGNOSIS — E66.01 OBESITY, CLASS III, BMI 40-49.9 (MORBID OBESITY) (MULTI): ICD-10-CM

## 2025-04-25 DIAGNOSIS — E11.21 TYPE 2 DIABETES MELLITUS WITH DIABETIC NEPHROPATHY, UNSPECIFIED WHETHER LONG TERM INSULIN USE: ICD-10-CM

## 2025-04-25 DIAGNOSIS — L40.0 PSORIASIS VULGARIS: ICD-10-CM

## 2025-04-25 PROCEDURE — 99214 OFFICE O/P EST MOD 30 MIN: CPT | Performed by: STUDENT IN AN ORGANIZED HEALTH CARE EDUCATION/TRAINING PROGRAM

## 2025-04-25 PROCEDURE — 3078F DIAST BP <80 MM HG: CPT | Performed by: STUDENT IN AN ORGANIZED HEALTH CARE EDUCATION/TRAINING PROGRAM

## 2025-04-25 PROCEDURE — 1036F TOBACCO NON-USER: CPT | Performed by: STUDENT IN AN ORGANIZED HEALTH CARE EDUCATION/TRAINING PROGRAM

## 2025-04-25 PROCEDURE — 3074F SYST BP LT 130 MM HG: CPT | Performed by: STUDENT IN AN ORGANIZED HEALTH CARE EDUCATION/TRAINING PROGRAM

## 2025-04-25 PROCEDURE — 4010F ACE/ARB THERAPY RXD/TAKEN: CPT | Performed by: STUDENT IN AN ORGANIZED HEALTH CARE EDUCATION/TRAINING PROGRAM

## 2025-04-25 NOTE — PROGRESS NOTES
Subjective   Patient ID: Cedric Lomeli is a 29 y.o. male who presents for No chief complaint on file..  HPI  Jerel is here for follow up visit.    Recently followed up with endocrinology. He is tolerating the Mounjaro at current dose. He has lost about 20 lbs in the last 6 months. He is planning a trip to the [facility] to visit family in the coming weeks.    He has been taking all of his medications as prescribed.    Review of Systems  12-point ROS was reviewed and is negative, unless otherwise noted in HPI    Objective   Vitals:    04/25/25 0903   BP: 124/76        Physical Exam  GEN: alert, conversant, NAD  HEENT: PERRL, EOMI, MMM, Tms pearly gray bilaterally  NECK: supple, no LAD appreciated  CHEST: CTAB  CV: S1, S2, RRR, no murmurs appreciated  ABD: soft, NT, ND, obese  EXT: no significant LE edema. Negative tinels, phalens  SKIN: psoriasis much improved    Assessment/Plan   #Obesity, Class III  #ERIK/OHS  - Counseled continued efforts on lifestyle modification including weight loss, diet, and increased exercise for >5 minutes  - Following up with Sleep Medicine, re-ordered sleep study. He has not been on PAP therapy    #carpal tunnel syndrome, bilateral  - discuss modifications: stretching, limiting repetitive movements and wrist bracing    # HTN  #HFpEF  Well controlled  - Continue Lisinopril 10mg daily  Discussed DASH diet and dietary sodium restrictions.   Increase dietary efforts and physical activity.   - Follow up with Cardiology as scheduled     # Diabetes Mellitus type 2  He has not been using insulin recently.  Hgba1c 5.8% 4/2025  Follows with Endocrinology   Continue current management: Mounjaro 10mg weekly and Metformin 1000mg BID  Lipid panel, microalbumin checked less than a year ago.  Increase dietary efforts and physical activity.  Routine diabetic retinopathy screening, foot exam/monofilament testing screening: up-to-date.    #psoriasis  Following with dermatology, doing well on  biologic therapy    Health Maintenance:  Vaccines: COVID (x3), Flu (advised), TDAP (2/2024), pneumonia (2013)  Screening: N/A  Labs: Reviewed recent, none needed today     RTC in 6 months, or sooner PRN    Feliciano Page DO    Trainee role: Resident    I saw and evaluated the patient. I personally obtained the key and critical portions of the history and physical exam or was physically present for key and critical portions performed by the trainee. I reviewed the trainee's documentation and discussed the patient with the trainee. I agree with the trainee's medical decision making as documented on the trainee's notes.    Dc Helms DO

## 2025-05-07 NOTE — ASSESSMENT & PLAN NOTE
Orders:    tirzepatide (Mounjaro) 10 mg/0.5 mL pen injector; Inject 10 mg under the skin every 7 days.    Follow Up In Endocrinology; Future

## 2025-07-12 ENCOUNTER — CLINICAL SUPPORT (OUTPATIENT)
Dept: SLEEP MEDICINE | Facility: CLINIC | Age: 30
End: 2025-07-12
Payer: COMMERCIAL

## 2025-07-12 DIAGNOSIS — G47.33 OBSTRUCTIVE SLEEP APNEA: ICD-10-CM

## 2025-07-12 PROCEDURE — 95810 POLYSOM 6/> YRS 4/> PARAM: CPT | Performed by: HOSPITALIST

## 2025-07-13 VITALS
BODY MASS INDEX: 43.32 KG/M2 | DIASTOLIC BLOOD PRESSURE: 73 MMHG | HEIGHT: 67 IN | WEIGHT: 276.02 LBS | SYSTOLIC BLOOD PRESSURE: 109 MMHG

## 2025-07-13 ASSESSMENT — SLEEP AND FATIGUE QUESTIONNAIRES
HOW LIKELY ARE YOU TO NOD OFF OR FALL ASLEEP WHILE SITTING QUIETLY AFTER LUNCH WITHOUT ALCOHOL: WOULD NEVER DOZE
HOW LIKELY ARE YOU TO NOD OFF OR FALL ASLEEP IN A CAR, WHILE STOPPED FOR A FEW MINUTES IN TRAFFIC: WOULD NEVER DOZE
HOW LIKELY ARE YOU TO NOD OFF OR FALL ASLEEP WHEN YOU ARE A PASSENGER IN A CAR FOR AN HOUR WITHOUT A BREAK: WOULD NEVER DOZE
HOW LIKELY ARE YOU TO NOD OFF OR FALL ASLEEP WHILE LYING DOWN TO REST IN THE AFTERNOON WHEN CIRCUMSTANCES PERMIT: MODERATE CHANCE OF DOZING
HOW LIKELY ARE YOU TO NOD OFF OR FALL ASLEEP WHILE WATCHING TV: WOULD NEVER DOZE
ESS-CHAD TOTAL SCORE: 2
HOW LIKELY ARE YOU TO NOD OFF OR FALL ASLEEP WHILE SITTING AND READING: WOULD NEVER DOZE
SITING INACTIVE IN A PUBLIC PLACE LIKE A CLASS ROOM OR A MOVIE THEATER: WOULD NEVER DOZE
HOW LIKELY ARE YOU TO NOD OFF OR FALL ASLEEP WHILE SITTING AND TALKING TO SOMEONE: WOULD NEVER DOZE

## 2025-07-13 NOTE — PROGRESS NOTES
[facility] TECH NOTE:     Patient: Cedric Lomeli   MRN//AGE: [id number]  1995  30 y.o.   Technologist: Edith Hilario   Room: 4   Service Date: 25        Sleep Testing Location: [facility]: 2    TECHNOLOGIST SLEEP STUDY PROCEDURE NOTE:   This sleep study is being conducted according to the policies and procedures outlined by the [employer] accreditation standards.  The sleep study procedure and processes involved during this appointment was explained to the patient/patient’s family, questions were answered. The patient/family verbalized understanding.      The patient is a 30 y.o. year old male scheduled for adiagnostic PSG with montage of: SPLIT. he arrived for his appointment.      The study that was ultimately completed was a diagnostic PSG with montage of: SPLIT.    The full study Was completed.  Patient questionnaires completed?: yes     Consents signed? yes    Initial Fall Risk Screening:     Cedric has not fallen in the last 6 months. his did not result in injury. Cedric does not have a fear of falling. He does not need assistance with sitting, standing, or walking. he does not need assistance walking in his home. he does not need assistance in an unfamiliar setting. The patient is notusing an assistive device.     Brief Study observations: The patient is a 30 year old male here for a diagnostic PSG.  Tawanda arrived at 1940 and completed paperwork.  The patient did not sign the consent to use the Respironics CPAP.  The study process and procedure were explained and questions answered.     Deviation to order/protocol and reason: none        Other:None    After the procedure, the patient/family was informed to ensure followup with ordering clinician for testing results.      Technologist: Edith Hilario

## 2025-10-21 ENCOUNTER — APPOINTMENT (OUTPATIENT)
Facility: CLINIC | Age: 30
End: 2025-10-21
Payer: COMMERCIAL

## 2025-10-24 ENCOUNTER — APPOINTMENT (OUTPATIENT)
Dept: PRIMARY CARE | Facility: CLINIC | Age: 30
End: 2025-10-24
Payer: COMMERCIAL

## 2025-11-25 ENCOUNTER — APPOINTMENT (OUTPATIENT)
Dept: ENDOCRINOLOGY | Facility: CLINIC | Age: 30
End: 2025-11-25
Payer: COMMERCIAL

## 2026-03-23 ENCOUNTER — APPOINTMENT (OUTPATIENT)
Dept: OPHTHALMOLOGY | Facility: CLINIC | Age: 31
End: 2026-03-23
Payer: COMMERCIAL